# Patient Record
Sex: FEMALE | Race: WHITE | NOT HISPANIC OR LATINO | Employment: OTHER | URBAN - METROPOLITAN AREA
[De-identification: names, ages, dates, MRNs, and addresses within clinical notes are randomized per-mention and may not be internally consistent; named-entity substitution may affect disease eponyms.]

---

## 2023-03-24 LAB
ALANINE AMINOTRANSFERASE (SGPT) (U/L) IN SER/PLAS: 10 U/L (ref 7–45)
ALBUMIN (G/DL) IN SER/PLAS: 4.4 G/DL (ref 3.4–5)
ALKALINE PHOSPHATASE (U/L) IN SER/PLAS: 35 U/L (ref 33–136)
ANION GAP IN SER/PLAS: 11 MMOL/L (ref 10–20)
ASPARTATE AMINOTRANSFERASE (SGOT) (U/L) IN SER/PLAS: 15 U/L (ref 9–39)
BASOPHILS (10*3/UL) IN BLOOD BY MANUAL COUNT - WAM: 0.12 X10E9/L (ref 0–0.1)
BASOPHILS/100 LEUKOCYTES IN BLOOD BY MANUAL COUNT - WAM: 6.2 % (ref 0–2)
BILIRUBIN TOTAL (MG/DL) IN SER/PLAS: 0.6 MG/DL (ref 0–1.2)
CALCIUM (MG/DL) IN SER/PLAS: 9.9 MG/DL (ref 8.6–10.6)
CARBON DIOXIDE, TOTAL (MMOL/L) IN SER/PLAS: 30 MMOL/L (ref 21–32)
CHLORIDE (MMOL/L) IN SER/PLAS: 103 MMOL/L (ref 98–107)
CREATININE (MG/DL) IN SER/PLAS: 0.87 MG/DL (ref 0.5–1.05)
EOSINOPHILS (10*3/UL) IN BLOOD BY MANUAL COUNT - WAM: 0.12 X10E9/L (ref 0–0.4)
EOSINOPHILS/100 LEUKOCYTES IN BLOOD BY MANUAL COUNT - WAM: 6.2 % (ref 0–6)
ERYTHROCYTE DISTRIBUTION WIDTH (RATIO) BY AUTOMATED COUNT: 16.8 % (ref 11.5–14.5)
ERYTHROCYTE MEAN CORPUSCULAR HEMOGLOBIN CONCENTRATION (G/DL) BY AUTOMATED: 33.5 G/DL (ref 32–36)
ERYTHROCYTE MEAN CORPUSCULAR VOLUME (FL) BY AUTOMATED COUNT: 99 FL (ref 80–100)
ERYTHROCYTES (10*6/UL) IN BLOOD BY AUTOMATED COUNT: 3.39 X10E12/L (ref 4–5.2)
GFR FEMALE: 66 ML/MIN/1.73M2
GLUCOSE (MG/DL) IN SER/PLAS: 90 MG/DL (ref 74–99)
HEMATOCRIT (%) IN BLOOD BY AUTOMATED COUNT: 33.4 % (ref 36–46)
HEMOGLOBIN (G/DL) IN BLOOD: 11.2 G/DL (ref 12–16)
IMMATURE GRANULOCYTES/100 LEUKOCYTES IN BLOOD BY AUTOMATED COUNT: 0 % (ref 0–0.9)
LEUKOCYTES (10*3/UL) IN BLOOD BY AUTOMATED COUNT: 1.9 X10E9/L (ref 4.4–11.3)
LYMPHOCYTES (10*3/UL) IN BLOOD BY MANUAL COUNT - WAM: 0.44 X10E9/L (ref 0.8–3)
LYMPHOCYTES VARIANT/100 LEUKOCYTES IN BLOOD - WAM: 15.2 % (ref 0–2)
LYMPHOCYTES/100 LEUKOCYTES IN BLOOD BY MANUAL COUNT - WAM: 23.2 % (ref 13–44)
MANUAL DIFFERENTIAL Y/N: ABNORMAL
MONOCYTES (10*3/UL) IN BLOOD BY MANUAL COUNT - WAM: 0.12 X10E9/L (ref 0.05–0.8)
MONOCYTES/100 LEUKOCYTES IN BLOOD BY MANUAL COUNT - WAM: 6.3 % (ref 2–10)
NEUTROPHILS (SEGS+BANDS) (10*3/UL) MANUAL COUNT - WAM: 0.82 X10E9/L (ref 1.6–5.5)
NRBC (PER 100 WBCS) BY AUTOMATED COUNT: 0 /100 WBC (ref 0–0)
PLATELETS (10*3/UL) IN BLOOD AUTOMATED COUNT: 349 X10E9/L (ref 150–450)
POTASSIUM (MMOL/L) IN SER/PLAS: 4.7 MMOL/L (ref 3.5–5.3)
PROTEIN TOTAL: 6.7 G/DL (ref 6.4–8.2)
RBC MORPHOLOGY IN BLOOD: NORMAL
SEGMENTED NEUTROPHILS (10*3/UL) BLOOD MANUAL - WAM: 0.82 X10E9/L (ref 1.6–5)
SEGMENTED NEUTROPHILS/100 LEUKOCYTES BY MANUAL COUNT -: 42.9 % (ref 40–80)
SODIUM (MMOL/L) IN SER/PLAS: 139 MMOL/L (ref 136–145)
THYROTROPIN (MIU/L) IN SER/PLAS BY DETECTION LIMIT <= 0.05 MIU/L: 0.81 MIU/L (ref 0.44–3.98)
UREA NITROGEN (MG/DL) IN SER/PLAS: 18 MG/DL (ref 6–23)
VARIANT LYMPHOCYTES (10*3/UL) BLOOD MANUAL COUNT - WAM: 0.29 X10E9/L (ref 0–0.3)

## 2023-04-05 ENCOUNTER — OFFICE VISIT (OUTPATIENT)
Dept: PRIMARY CARE | Facility: CLINIC | Age: 83
End: 2023-04-05
Payer: MEDICARE

## 2023-04-05 VITALS
HEIGHT: 65 IN | WEIGHT: 149 LBS | DIASTOLIC BLOOD PRESSURE: 84 MMHG | OXYGEN SATURATION: 98 % | SYSTOLIC BLOOD PRESSURE: 125 MMHG | BODY MASS INDEX: 24.83 KG/M2 | TEMPERATURE: 95.1 F | HEART RATE: 84 BPM

## 2023-04-05 DIAGNOSIS — J01.10 ACUTE FRONTAL SINUSITIS, RECURRENCE NOT SPECIFIED: Primary | ICD-10-CM

## 2023-04-05 PROBLEM — S46.819A STRAIN OF TRAPEZIUS MUSCLE: Status: ACTIVE | Noted: 2023-04-05

## 2023-04-05 PROBLEM — J31.0 CHRONIC RHINITIS: Status: ACTIVE | Noted: 2023-04-05

## 2023-04-05 PROBLEM — N63.0 BREAST MASS IN FEMALE: Status: ACTIVE | Noted: 2023-04-05

## 2023-04-05 PROBLEM — S20.219A CONTUSION OF CHEST: Status: ACTIVE | Noted: 2023-04-05

## 2023-04-05 PROBLEM — M25.562 CHRONIC PAIN OF LEFT KNEE: Status: ACTIVE | Noted: 2023-04-05

## 2023-04-05 PROBLEM — E05.00 GRAVES DISEASE: Status: ACTIVE | Noted: 2023-04-05

## 2023-04-05 PROBLEM — G62.9 PERIPHERAL NEUROPATHY: Status: ACTIVE | Noted: 2023-04-05

## 2023-04-05 PROBLEM — M53.9 MULTILEVEL DEGENERATIVE DISC DISEASE: Status: ACTIVE | Noted: 2023-04-05

## 2023-04-05 PROBLEM — U07.1 COVID-19: Status: ACTIVE | Noted: 2023-04-05

## 2023-04-05 PROBLEM — E55.9 VITAMIN D DEFICIENCY: Status: ACTIVE | Noted: 2023-04-05

## 2023-04-05 PROBLEM — E78.5 DYSLIPIDEMIA: Status: ACTIVE | Noted: 2023-04-05

## 2023-04-05 PROBLEM — E04.1 THYROID NODULE: Status: ACTIVE | Noted: 2023-04-05

## 2023-04-05 PROBLEM — G89.29 CHRONIC PAIN OF LEFT KNEE: Status: ACTIVE | Noted: 2023-04-05

## 2023-04-05 PROBLEM — E03.9 HYPOTHYROIDISM: Status: ACTIVE | Noted: 2023-04-05

## 2023-04-05 PROBLEM — M48.061 LUMBAR SPINAL STENOSIS: Status: ACTIVE | Noted: 2023-04-05

## 2023-04-05 PROCEDURE — 99213 OFFICE O/P EST LOW 20 MIN: CPT | Performed by: FAMILY MEDICINE

## 2023-04-05 PROCEDURE — 1036F TOBACCO NON-USER: CPT | Performed by: FAMILY MEDICINE

## 2023-04-05 PROCEDURE — 1160F RVW MEDS BY RX/DR IN RCRD: CPT | Performed by: FAMILY MEDICINE

## 2023-04-05 PROCEDURE — 1159F MED LIST DOCD IN RCRD: CPT | Performed by: FAMILY MEDICINE

## 2023-04-05 RX ORDER — LETROZOLE 2.5 MG/1
TABLET, FILM COATED ORAL
COMMUNITY
End: 2023-10-27 | Stop reason: SDUPTHER

## 2023-04-05 RX ORDER — ACETAMINOPHEN 500 MG
50 TABLET ORAL DAILY
COMMUNITY

## 2023-04-05 RX ORDER — IBUPROFEN 600 MG/1
1 TABLET ORAL EVERY 6 HOURS PRN
COMMUNITY
Start: 2022-12-27 | End: 2023-10-11 | Stop reason: SDUPTHER

## 2023-04-05 RX ORDER — CEFDINIR 300 MG/1
300 CAPSULE ORAL 2 TIMES DAILY
Qty: 20 CAPSULE | Refills: 0 | Status: SHIPPED | OUTPATIENT
Start: 2023-04-05 | End: 2023-04-15

## 2023-04-05 RX ORDER — LEVOTHYROXINE SODIUM 100 UG/1
100 TABLET ORAL DAILY
COMMUNITY
Start: 2018-03-02

## 2023-04-05 ASSESSMENT — PATIENT HEALTH QUESTIONNAIRE - PHQ9
SUM OF ALL RESPONSES TO PHQ9 QUESTIONS 1 & 2: 0
2. FEELING DOWN, DEPRESSED OR HOPELESS: NOT AT ALL
1. LITTLE INTEREST OR PLEASURE IN DOING THINGS: NOT AT ALL

## 2023-04-05 ASSESSMENT — LIFESTYLE VARIABLES
SKIP TO QUESTIONS 9-10: 1
HOW OFTEN DO YOU HAVE SIX OR MORE DRINKS ON ONE OCCASION: NEVER
HOW OFTEN DO YOU HAVE A DRINK CONTAINING ALCOHOL: 4 OR MORE TIMES A WEEK
HOW MANY STANDARD DRINKS CONTAINING ALCOHOL DO YOU HAVE ON A TYPICAL DAY: 1 OR 2
AUDIT-C TOTAL SCORE: 4

## 2023-04-05 NOTE — PROGRESS NOTES
"82-year-old female with 3-day history of frontal headache otalgia blocked ears stuffy nose without otorrhea sore throat myalgias arthralgias fevers chills sweats shortness of breath chest congestion    Complains of hearing loss in ears bilaterally worse in left  She denies tinnitus vertigo    /84   Pulse 84   Temp 35.1 °C (95.1 °F)   Ht 1.651 m (5' 5\")   Wt 67.6 kg (149 lb)   SpO2 98%   BMI 24.79 kg/m²       Nontoxic-appearing  No retractions  Skin without pallor petechia icterus cyanosis  Left TM initially obscured by cerumen in left canal this was removed with cerumen spoon and irrigation  Right TM with mucoid effusion  Oropharynx red without exudates  Nasal mucosa red with scant rhinorrhea  Bilateral frontal sinus tenderness no maxillary sinus tenderness  Neck without JVD thyromegaly bruits  Chest wall nontender  Chest clear to auscultation without rales rhonchi wheeze  Heart regular rate and rhythm without murmur  Abdomen soft nondistended nontender without organomegaly or mass  "

## 2023-04-24 LAB
BASOPHILS (10*3/UL) IN BLOOD BY AUTOMATED COUNT: 0.06 X10E9/L (ref 0–0.1)
BASOPHILS/100 LEUKOCYTES IN BLOOD BY AUTOMATED COUNT: 2 % (ref 0–2)
EOSINOPHILS (10*3/UL) IN BLOOD BY AUTOMATED COUNT: 0.14 X10E9/L (ref 0–0.4)
EOSINOPHILS/100 LEUKOCYTES IN BLOOD BY AUTOMATED COUNT: 4.6 % (ref 0–6)
ERYTHROCYTE DISTRIBUTION WIDTH (RATIO) BY AUTOMATED COUNT: 16.1 % (ref 11.5–14.5)
ERYTHROCYTE MEAN CORPUSCULAR HEMOGLOBIN CONCENTRATION (G/DL) BY AUTOMATED: 33.3 G/DL (ref 32–36)
ERYTHROCYTE MEAN CORPUSCULAR VOLUME (FL) BY AUTOMATED COUNT: 102 FL (ref 80–100)
ERYTHROCYTES (10*6/UL) IN BLOOD BY AUTOMATED COUNT: 3.46 X10E12/L (ref 4–5.2)
HEMATOCRIT (%) IN BLOOD BY AUTOMATED COUNT: 35.4 % (ref 36–46)
HEMOGLOBIN (G/DL) IN BLOOD: 11.8 G/DL (ref 12–16)
IMMATURE GRANULOCYTES/100 LEUKOCYTES IN BLOOD BY AUTOMATED COUNT: 0.3 % (ref 0–0.9)
LEUKOCYTES (10*3/UL) IN BLOOD BY AUTOMATED COUNT: 3 X10E9/L (ref 4.4–11.3)
LYMPHOCYTES (10*3/UL) IN BLOOD BY AUTOMATED COUNT: 1.03 X10E9/L (ref 0.8–3)
LYMPHOCYTES/100 LEUKOCYTES IN BLOOD BY AUTOMATED COUNT: 34.1 % (ref 13–44)
MONOCYTES (10*3/UL) IN BLOOD BY AUTOMATED COUNT: 0.53 X10E9/L (ref 0.05–0.8)
MONOCYTES/100 LEUKOCYTES IN BLOOD BY AUTOMATED COUNT: 17.5 % (ref 2–10)
NEUTROPHILS (10*3/UL) IN BLOOD BY AUTOMATED COUNT: 1.25 X10E9/L (ref 1.6–5.5)
NEUTROPHILS/100 LEUKOCYTES IN BLOOD BY AUTOMATED COUNT: 41.5 % (ref 40–80)
PLATELETS (10*3/UL) IN BLOOD AUTOMATED COUNT: 286 X10E9/L (ref 150–450)

## 2023-04-25 LAB
ALANINE AMINOTRANSFERASE (SGPT) (U/L) IN SER/PLAS: 14 U/L (ref 7–45)
ALBUMIN (G/DL) IN SER/PLAS: 4.4 G/DL (ref 3.4–5)
ALKALINE PHOSPHATASE (U/L) IN SER/PLAS: 35 U/L (ref 33–136)
ANION GAP IN SER/PLAS: 13 MMOL/L (ref 10–20)
ASPARTATE AMINOTRANSFERASE (SGOT) (U/L) IN SER/PLAS: 17 U/L (ref 9–39)
BILIRUBIN TOTAL (MG/DL) IN SER/PLAS: 0.5 MG/DL (ref 0–1.2)
CALCIUM (MG/DL) IN SER/PLAS: 9.9 MG/DL (ref 8.6–10.6)
CARBON DIOXIDE, TOTAL (MMOL/L) IN SER/PLAS: 27 MMOL/L (ref 21–32)
CHLORIDE (MMOL/L) IN SER/PLAS: 104 MMOL/L (ref 98–107)
CREATININE (MG/DL) IN SER/PLAS: 0.8 MG/DL (ref 0.5–1.05)
GFR FEMALE: 73 ML/MIN/1.73M2
GLUCOSE (MG/DL) IN SER/PLAS: 123 MG/DL (ref 74–99)
POTASSIUM (MMOL/L) IN SER/PLAS: 4.3 MMOL/L (ref 3.5–5.3)
PROTEIN TOTAL: 6.9 G/DL (ref 6.4–8.2)
SODIUM (MMOL/L) IN SER/PLAS: 140 MMOL/L (ref 136–145)
UREA NITROGEN (MG/DL) IN SER/PLAS: 16 MG/DL (ref 6–23)

## 2023-07-18 LAB
ALANINE AMINOTRANSFERASE (SGPT) (U/L) IN SER/PLAS: 16 U/L (ref 7–45)
ALBUMIN (G/DL) IN SER/PLAS: 4.3 G/DL (ref 3.4–5)
ALKALINE PHOSPHATASE (U/L) IN SER/PLAS: 36 U/L (ref 33–136)
ANION GAP IN SER/PLAS: 13 MMOL/L (ref 10–20)
ASPARTATE AMINOTRANSFERASE (SGOT) (U/L) IN SER/PLAS: 20 U/L (ref 9–39)
BASOPHILS (10*3/UL) IN BLOOD BY AUTOMATED COUNT: 0.08 X10E9/L (ref 0–0.1)
BASOPHILS/100 LEUKOCYTES IN BLOOD BY AUTOMATED COUNT: 2.6 % (ref 0–2)
BILIRUBIN TOTAL (MG/DL) IN SER/PLAS: 0.5 MG/DL (ref 0–1.2)
CALCIUM (MG/DL) IN SER/PLAS: 9.7 MG/DL (ref 8.6–10.6)
CARBON DIOXIDE, TOTAL (MMOL/L) IN SER/PLAS: 26 MMOL/L (ref 21–32)
CHLORIDE (MMOL/L) IN SER/PLAS: 103 MMOL/L (ref 98–107)
CREATININE (MG/DL) IN SER/PLAS: 0.84 MG/DL (ref 0.5–1.05)
EOSINOPHILS (10*3/UL) IN BLOOD BY AUTOMATED COUNT: 0.09 X10E9/L (ref 0–0.4)
EOSINOPHILS/100 LEUKOCYTES IN BLOOD BY AUTOMATED COUNT: 2.9 % (ref 0–6)
ERYTHROCYTE DISTRIBUTION WIDTH (RATIO) BY AUTOMATED COUNT: 15.9 % (ref 11.5–14.5)
ERYTHROCYTE MEAN CORPUSCULAR HEMOGLOBIN CONCENTRATION (G/DL) BY AUTOMATED: 33.1 G/DL (ref 32–36)
ERYTHROCYTE MEAN CORPUSCULAR VOLUME (FL) BY AUTOMATED COUNT: 98 FL (ref 80–100)
ERYTHROCYTES (10*6/UL) IN BLOOD BY AUTOMATED COUNT: 3.67 X10E12/L (ref 4–5.2)
GFR FEMALE: 69 ML/MIN/1.73M2
GLUCOSE (MG/DL) IN SER/PLAS: 90 MG/DL (ref 74–99)
HEMATOCRIT (%) IN BLOOD BY AUTOMATED COUNT: 35.9 % (ref 36–46)
HEMOGLOBIN (G/DL) IN BLOOD: 11.9 G/DL (ref 12–16)
IMMATURE GRANULOCYTES/100 LEUKOCYTES IN BLOOD BY AUTOMATED COUNT: 0.3 % (ref 0–0.9)
LEUKOCYTES (10*3/UL) IN BLOOD BY AUTOMATED COUNT: 3.1 X10E9/L (ref 4.4–11.3)
LYMPHOCYTES (10*3/UL) IN BLOOD BY AUTOMATED COUNT: 1.18 X10E9/L (ref 0.8–3)
LYMPHOCYTES/100 LEUKOCYTES IN BLOOD BY AUTOMATED COUNT: 37.9 % (ref 13–44)
MONOCYTES (10*3/UL) IN BLOOD BY AUTOMATED COUNT: 0.49 X10E9/L (ref 0.05–0.8)
MONOCYTES/100 LEUKOCYTES IN BLOOD BY AUTOMATED COUNT: 15.8 % (ref 2–10)
NEUTROPHILS (10*3/UL) IN BLOOD BY AUTOMATED COUNT: 1.26 X10E9/L (ref 1.6–5.5)
NEUTROPHILS/100 LEUKOCYTES IN BLOOD BY AUTOMATED COUNT: 40.5 % (ref 40–80)
NRBC (PER 100 WBCS) BY AUTOMATED COUNT: 0 /100 WBC (ref 0–0)
PLATELETS (10*3/UL) IN BLOOD AUTOMATED COUNT: 319 X10E9/L (ref 150–450)
POTASSIUM (MMOL/L) IN SER/PLAS: 4.4 MMOL/L (ref 3.5–5.3)
PROTEIN TOTAL: 6.8 G/DL (ref 6.4–8.2)
SODIUM (MMOL/L) IN SER/PLAS: 138 MMOL/L (ref 136–145)
UREA NITROGEN (MG/DL) IN SER/PLAS: 13 MG/DL (ref 6–23)

## 2023-09-25 ENCOUNTER — OFFICE VISIT (OUTPATIENT)
Dept: PRIMARY CARE | Facility: CLINIC | Age: 83
End: 2023-09-25
Payer: MEDICARE

## 2023-09-25 VITALS
SYSTOLIC BLOOD PRESSURE: 153 MMHG | WEIGHT: 148.9 LBS | TEMPERATURE: 97.6 F | HEART RATE: 85 BPM | OXYGEN SATURATION: 96 % | BODY MASS INDEX: 24.78 KG/M2 | DIASTOLIC BLOOD PRESSURE: 91 MMHG

## 2023-09-25 DIAGNOSIS — M25.431 PAIN AND SWELLING OF RIGHT WRIST: ICD-10-CM

## 2023-09-25 DIAGNOSIS — S43.421A SPRAIN OF RIGHT ROTATOR CUFF CAPSULE, INITIAL ENCOUNTER: Primary | ICD-10-CM

## 2023-09-25 DIAGNOSIS — M25.531 PAIN AND SWELLING OF RIGHT WRIST: ICD-10-CM

## 2023-09-25 DIAGNOSIS — S06.0XAD CONCUSSION WITH UNKNOWN LOSS OF CONSCIOUSNESS STATUS, SUBSEQUENT ENCOUNTER: ICD-10-CM

## 2023-09-25 PROCEDURE — 99215 OFFICE O/P EST HI 40 MIN: CPT | Performed by: FAMILY MEDICINE

## 2023-09-25 PROCEDURE — 1159F MED LIST DOCD IN RCRD: CPT | Performed by: FAMILY MEDICINE

## 2023-09-25 PROCEDURE — 1160F RVW MEDS BY RX/DR IN RCRD: CPT | Performed by: FAMILY MEDICINE

## 2023-09-25 PROCEDURE — 1036F TOBACCO NON-USER: CPT | Performed by: FAMILY MEDICINE

## 2023-09-25 ASSESSMENT — PATIENT HEALTH QUESTIONNAIRE - PHQ9
2. FEELING DOWN, DEPRESSED OR HOPELESS: NOT AT ALL
1. LITTLE INTEREST OR PLEASURE IN DOING THINGS: NOT AT ALL
SUM OF ALL RESPONSES TO PHQ9 QUESTIONS 1 AND 2: 0

## 2023-09-25 NOTE — PROGRESS NOTES
83-year-old female seen with her son today.  Apparently her son was called by her father told him that his wife had fallen and needs help getting out.  He arrived on the scene to find dried blood on the floor and on the countertop with his mom awake but lying on the floor.  She had blood covering her face and torso.  She refused to go to the emergency room she is here today.  She recalls getting up from a table and walking in the kitchen.  She is amnestic about the event and remembers her son coming over.  Son estimates that perhaps she was down for about an hour level of consciousness after the fall is indeterminate.  She took Advil and went to bed but was complaining of shoulder and hand pain.  She has sustained a deep laceration over her right periorbital area and this was cleaned and closed with a bandage.  Son noted that she was not lethargic or sleepy after the fall.  There is no dysarthria or aphasia.  She denies numbness tingling or weakness.  She does have right frontal periorbital headache without nausea vomiting fevers chills neck stiffness.          BP (!) 153/91   Pulse 85   Temp 36.4 °C (97.6 °F)   Wt 67.5 kg (148 lb 14.4 oz)   SpO2 96%   BMI 24.78 kg/m²     Large full-thickness C-shaped 2 and half centimeter laceration right periorbital area  Right frontal and parietal tenderness to palpation  TMs normal without hemotympanums  No crump sign  No midline cervical paracervical tenderness or spasm  Chest clear to auscultation  Regular rate rhythm without rub  Right AC nontender without deformity  Rotator cuff range of motion and strength limited on the right  Ecchymosis over proximal humerus and mid humerus and hand dorsum of the right  No gross deformity  Nontender  Extremity without erythema or tenderness  Neuro exam limited but nonfocal    Patient was instructed to be taken by son to the emergency room for further evaluation which will  include laceration repair noncontrast CT of the head and  x-rays of the right shoulder right humerus and wrist    Discussed with ED triage @ [facility]

## 2023-10-03 ENCOUNTER — OFFICE VISIT (OUTPATIENT)
Dept: ORTHOPEDIC SURGERY | Facility: CLINIC | Age: 83
End: 2023-10-03
Payer: MEDICARE

## 2023-10-03 VITALS — WEIGHT: 145 LBS | HEIGHT: 65 IN | BODY MASS INDEX: 24.16 KG/M2

## 2023-10-03 DIAGNOSIS — S42.293D OTHER CLOSED DISPLACED FRACTURE OF PROXIMAL END OF HUMERUS WITH ROUTINE HEALING, UNSPECIFIED LATERALITY, SUBSEQUENT ENCOUNTER: Primary | ICD-10-CM

## 2023-10-03 PROCEDURE — 1036F TOBACCO NON-USER: CPT | Performed by: ORTHOPAEDIC SURGERY

## 2023-10-03 PROCEDURE — 1159F MED LIST DOCD IN RCRD: CPT | Performed by: ORTHOPAEDIC SURGERY

## 2023-10-03 PROCEDURE — 1160F RVW MEDS BY RX/DR IN RCRD: CPT | Performed by: ORTHOPAEDIC SURGERY

## 2023-10-03 PROCEDURE — 99203 OFFICE O/P NEW LOW 30 MIN: CPT | Performed by: ORTHOPAEDIC SURGERY

## 2023-10-03 NOTE — PROGRESS NOTES
83-year-old right-hand-dominant female fell injuring her right shoulder does not recall the exact mechanism of injury  She does have a history of breast CA with mets to the spine and sternum  She is undergoing treatment for  She had no antecedent shoulder pain  Past medical,family and social histories have been reviewed and are up to date.  All other body systems have been reviewed and are negative for complaint.  Constitutional: Well-developed well-nourished   Eyes: Sclerae anicteric, pupils equal and round  HENT: Normocephalic atraumatic  Cardiovascular: Pulses full, regular rate and rhythm  Respiratory: Breathing not labored, no wheezing  Integumentary: Skin intact, no lesions or rashes  Neurological: Sensation intact, no gross strength deficits, reflexes equal  Psychiatric: Alert oriented and appropriate  Hematologic/lymphatic: No lymphadenopathy  Right shoulder: Bruising down the humeral region some bruising down in the hand as well elbow wrist and hand are intact  X-rays show nondisplaced comminuted greater tuberosity fracture the shoulder was located  Unclear whether this is related to her breast CA  Plan at this point is to keep her in a sling for protection we will see her back in follow-up in 3 weeks  All questions answered

## 2023-10-03 NOTE — LETTER
October 5, 2023     Francisco Javier Bennett MD  [address]  [address]  [facility] [address]    Patient: Angela Hope   YOB: 1940   Date of Visit: 10/3/2023       Dear Dr. Francisco Javier Bennett MD:    Thank you for referring Angela Hope to me for evaluation. Below are my notes for this consultation.  If you have questions, please do not hesitate to call me. I look forward to following your patient along with you.       Sincerely,     Kenneth Hull MD      CC: No Recipients  ______________________________________________________________________________________    83-year-old right-hand-dominant female fell injuring her right shoulder does not recall the exact mechanism of injury  She does have a history of breast CA with mets to the spine and sternum  She is undergoing treatment for  She had no antecedent shoulder pain  Past medical,family and social histories have been reviewed and are up to date.  All other body systems have been reviewed and are negative for complaint.  Constitutional: Well-developed well-nourished   Eyes: Sclerae anicteric, pupils equal and round  HENT: Normocephalic atraumatic  Cardiovascular: Pulses full, regular rate and rhythm  Respiratory: Breathing not labored, no wheezing  Integumentary: Skin intact, no lesions or rashes  Neurological: Sensation intact, no gross strength deficits, reflexes equal  Psychiatric: Alert oriented and appropriate  Hematologic/lymphatic: No lymphadenopathy  Right shoulder: Bruising down the humeral region some bruising down in the hand as well elbow wrist and hand are intact  X-rays show nondisplaced comminuted greater tuberosity fracture the shoulder was located  Unclear whether this is related to her breast CA  Plan at this point is to keep her in a sling for protection we will see her back in follow-up in 3 weeks  All questions answered

## 2023-10-11 ENCOUNTER — TELEPHONE (OUTPATIENT)
Dept: PRIMARY CARE | Facility: CLINIC | Age: 83
End: 2023-10-11
Payer: MEDICARE

## 2023-10-11 DIAGNOSIS — G89.29 CHRONIC PAIN OF LEFT KNEE: Primary | ICD-10-CM

## 2023-10-11 DIAGNOSIS — M25.562 CHRONIC PAIN OF LEFT KNEE: Primary | ICD-10-CM

## 2023-10-11 RX ORDER — IBUPROFEN 600 MG/1
600 TABLET ORAL EVERY 6 HOURS PRN
Qty: 120 TABLET | Refills: 0 | Status: SHIPPED | OUTPATIENT
Start: 2023-10-11 | End: 2023-11-28

## 2023-10-11 NOTE — TELEPHONE ENCOUNTER
Rx Refill Request Telephone Encounter    Name:  Angela Hope  :  [id number]  Medication Name:  Ibuprofen   Dose : 600  mg   Route :    Frequency : 1 by mouth every 6 hrs as needed  Quantity : 120  Directions :    Specific Pharmacy location:  [facility]  Date of last appointment:  23  Date of next appointment:    Best number to reach patient:  [phone]

## 2023-10-24 ENCOUNTER — ANCILLARY PROCEDURE (OUTPATIENT)
Dept: RADIOLOGY | Facility: CLINIC | Age: 83
End: 2023-10-24
Payer: MEDICARE

## 2023-10-24 ENCOUNTER — OFFICE VISIT (OUTPATIENT)
Dept: ORTHOPEDIC SURGERY | Facility: CLINIC | Age: 83
End: 2023-10-24
Payer: MEDICARE

## 2023-10-24 VITALS — HEIGHT: 65 IN | BODY MASS INDEX: 25.09 KG/M2 | WEIGHT: 150.6 LBS

## 2023-10-24 DIAGNOSIS — M25.511 RIGHT SHOULDER PAIN, UNSPECIFIED CHRONICITY: ICD-10-CM

## 2023-10-24 DIAGNOSIS — M25.511 RIGHT SHOULDER PAIN, UNSPECIFIED CHRONICITY: Primary | ICD-10-CM

## 2023-10-24 PROCEDURE — 1036F TOBACCO NON-USER: CPT | Performed by: ORTHOPAEDIC SURGERY

## 2023-10-24 PROCEDURE — 1160F RVW MEDS BY RX/DR IN RCRD: CPT | Performed by: ORTHOPAEDIC SURGERY

## 2023-10-24 PROCEDURE — 1125F AMNT PAIN NOTED PAIN PRSNT: CPT | Performed by: ORTHOPAEDIC SURGERY

## 2023-10-24 PROCEDURE — 1159F MED LIST DOCD IN RCRD: CPT | Performed by: ORTHOPAEDIC SURGERY

## 2023-10-24 PROCEDURE — 73030 X-RAY EXAM OF SHOULDER: CPT | Mod: RT

## 2023-10-24 PROCEDURE — 99213 OFFICE O/P EST LOW 20 MIN: CPT | Performed by: ORTHOPAEDIC SURGERY

## 2023-10-24 PROCEDURE — 73030 X-RAY EXAM OF SHOULDER: CPT | Mod: RIGHT SIDE | Performed by: RADIOLOGY

## 2023-10-24 ASSESSMENT — PAIN DESCRIPTION - DESCRIPTORS: DESCRIPTORS: CRAMPING;SHOOTING

## 2023-10-24 ASSESSMENT — PAIN SCALES - GENERAL: PAINLEVEL_OUTOF10: 6

## 2023-10-24 ASSESSMENT — PAIN - FUNCTIONAL ASSESSMENT: PAIN_FUNCTIONAL_ASSESSMENT: 0-10

## 2023-10-24 NOTE — PROGRESS NOTES
Follow-up greater tuberosity fracture right shoulder she has been treated in sling she feels a bit better but still having significant discomfort and swelling  Past medical,family and social histories have been reviewed and are up to date.  All other body systems have been reviewed and are negative for complaint.  Constitutional: Well-developed well-nourished   Eyes: Sclerae anicteric, pupils equal and round  HENT: Normocephalic atraumatic  Cardiovascular: Pulses full, regular rate and rhythm  Respiratory: Breathing not labored, no wheezing  Integumentary: Skin intact, no lesions or rashes  Neurological: Sensation intact, no gross strength deficits, reflexes equal  Psychiatric: Alert oriented and appropriate  Hematologic/lymphatic: No lymphadenopathy  Right shoulder: Good passive mobility some distal swelling  X-rays show extensive calcification around the tuberosity  Impression healing tuberosity fracture  We will keep her in a sling for another couple of weeks then begin outpatient therapy we discussed prognosis and expectations of fairly lengthy recovery  All questions answered

## 2023-10-26 ENCOUNTER — APPOINTMENT (OUTPATIENT)
Dept: RADIOLOGY | Facility: CLINIC | Age: 83
End: 2023-10-26
Payer: MEDICARE

## 2023-10-27 DIAGNOSIS — N63.0 BREAST MASS IN FEMALE: Primary | ICD-10-CM

## 2023-10-27 RX ORDER — LETROZOLE 2.5 MG/1
2.5 TABLET, FILM COATED ORAL EVERY OTHER DAY
Qty: 30 TABLET | Refills: 5 | Status: SHIPPED | OUTPATIENT
Start: 2023-10-27 | End: 2023-12-04 | Stop reason: SDUPTHER

## 2023-10-27 NOTE — PROGRESS NOTES
[facility] Clinic  Location: [place]    Visit Type: Follow-up Visit    ONC History:    She presented with a left-sided breast cancer in , requiring neoadjuvant chemotherapy. In 2006, she had resection of a 2 cm grade 2 infiltrating ductal carcinoma with 8 of 10 positive axillary lymph nodes. She was clinically staged T3 N1,  although would be N2 by today's standards. Hormone receptors are positive and HER-2/liliana negative   FAMILY HISTORY: She had a maternal aunt with a head and neck cancer at 78. She had a brother who  of lung cancer in the context of smoking.      TREATMENT SUMMARY:    Neoadjuvant chemotherapy included 4 cycles of Adriamycin and cyclophosphamide, followed by 4 cycles of paclitaxel completed  2006.    Left lumpectomy and axillary node dissection, 2006.    Left breast radiation, completed 2007.    Anastrozole was started 2007, recently as she stopped her medication either March or 2013.   Anastrozole ongoing with initial treatment started 2007, and medications stopped around 2013, and then restarted in 2013 -- she stopped sometime in 2020: First Relapse Date: Recurrence confirmed to bone- ER+   2020: New Treatment Plan: Letrozole started   2020-3-10: Radiation Therapy: SBRT to lumbar spine   2020-3-24: Chemotherapy: Palbociclib started   2020: Chemotherapy: Palbociclib decreased to 100mg due to marrow toxicity   2021: Chemotherapy: Ibrance on hold   2022: Chemotherapy: restart of Ibrance 100mg      Subjective: Interval History    5 weeks ago had fall in kitchen, mechanical fall per patient. Broke her right humerus and has a laceration above right eyebrow. Seeing ortho. Taking ibuprofen. Because of this she was unable to get her scans done.     Otherwise, no major issues.     Continues on letrozole and ibrance.     Denies weight loss, changes in the breast and/or chest wall,  "new aches or pains, changes in appetite or energy level.     Social History  Angela A Ciganko  reports that she has never smoked. She has never used smokeless tobacco.  She  reports that she does not currently use alcohol.  She  reports no history of drug use.    ROS:     Review of Systems   All other systems reviewed and are negative.       Physical Examination:    /76 (BP Location: Right arm, Patient Position: Sitting, BP Cuff Size: Adult)   Pulse 64   Temp 36.3 °C (97.3 °F) (Temporal)   Resp 18   Ht 1.651 m (5' 5\")   Wt 69.2 kg (152 lb 8.9 oz)   SpO2 100%   BMI 25.39 kg/m²     Physical Exam  Vitals and nursing note reviewed.   Constitutional:       General: She is not in acute distress.     Appearance: Normal appearance. She is not toxic-appearing.   HENT:      Head: Normocephalic and atraumatic.      Mouth/Throat:      Pharynx: Oropharynx is clear.   Eyes:      Extraocular Movements: Extraocular movements intact.      Conjunctiva/sclera: Conjunctivae normal.   Cardiovascular:      Rate and Rhythm: Normal rate and regular rhythm.   Pulmonary:      Effort: Pulmonary effort is normal. No respiratory distress.   Abdominal:      General: Abdomen is flat. Bowel sounds are normal.      Palpations: Abdomen is soft.   Musculoskeletal:         General: No swelling. Normal range of motion.      Cervical back: Normal range of motion.   Skin:     General: Skin is warm and dry.   Neurological:      General: No focal deficit present.      Mental Status: She is alert.   Psychiatric:         Mood and Affect: Mood normal.         Breast Examination:    Deferred    ECOG Performance Status:     [x] 0 Fully active, able to carry on all pre-disease performance without restriction  [] 1 Restricted in physically strenuous activity but ambulatory and able to carry out work of a light or sedentary nature, e.g., light house work, office work  [] 2 Ambulatory and capable of all selfcare but unable to carry out any work " activities; up and about more than 50% of waking hours  [] 3 Capable of only limited selfcare; confined to bed or chair more than 50% of waking hours  [] 4 Completely disabled; cannot carry on any selfcare; totally confined to bed or chair  [] 5 Dead     Results:    Labs:  No pertinent results since last visit.      Imaging:  No pertinent results since last visit.      Pathology:  No pertinent results since last visit.      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 Has been on letrozole and palbociclib 100 mg since 2020.     Was unable to obtain most recent staging scans due to a fall from which she broke her humerus. She would like to defer these scans until she has gained ROM back in the R arm. Did not obtain labs since last visit, reports this was because she did not get a paper requisition at her last visit however, she did not call between visits to address this. Will obtain labs today. Follow-up in one month to assess shoulder and her ability to get scans again.       Plan:    Surgical Plan: Left lumpectomy and axillary node dissection, October 2006.   Additional biopsy: Not indicated  Radiation Plan: Left breast radiation, completed January 2007; SBRT to lumbar spine in 2020  Additional staging scans/DEXA/echo: NM bone scan reviewed. DEXA reviewed.   Additional Path info (i.e Ki67, PDL1):Not indicated   Gene assays: Not indicated     Systemic treatment plan: Letrozole and ibrance 100 mg                   Intent: Palliative                Clinical trial: N/A                Endocrine therapy: Progressed on anastrozole. On letrozole since 2020                HER2 treatment: Not indicated                Targeted agents: Ibrance                Chemotherapy: 4 cycles of Adriamycin and cyclophosphamide, followed by 4 cycles of paclitaxel completed September 2006.                 BMA: We discussed the role of zoledronic acid as supportive therapy in light of osseous disease to  minimize the risk and delay skeletal related events. We discussed that this would be given as  an infusion every 3 months. We discussed the toxicities that may be associated with this including the risk of osteonecrosis of the jaw. I have recommended an dental evaluation before this is initiated. She was given written information on Zoledronic  acid for her review and we will plan to initiate this at her follow-up visit once she has received clearance from her dentist. Will discuss again at her next visit.      Access: Not indicated  Supportive meds: Not indicated  Genetic testing: Will discuss at future visit  Fertility preservation: Not indicated  Other active problems/orders:      Osteopenia: Zometa discussed as above. We discussed general recommendations for bone loss prevention which include 1514-7080 mg of calcium intake per day for adults >50yrs, and no history of renal calculi;  800-1000 IU of vitamin D3 per day for adults >50yrs, and no history of renal calculi. Weight bearing exercise. Discontinue smoking. Avoid excessive use of caffeine, soft drinks, and alcoholic beverages. In addition, balance training and fall prevention  programs can help reduce the risk of fractures.      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 We discussed extensively that my practice is to involve patient's PCP in management of non-oncologic issues. We agreed on these expectations moving forward.      Surveillance plan: Will re-order CT CAP and NM bone scan at her follow-up visit if she has recovered from her injury; monthly labs and TM.    Follow-up: 1 month    Patient expressed understanding of the plan outlined above. She had ample time to ask questions. She understands she can contact us should she have additional questions or issues arise in the interim.

## 2023-10-30 ENCOUNTER — LAB (OUTPATIENT)
Dept: LAB | Facility: CLINIC | Age: 83
End: 2023-10-30
Payer: MEDICARE

## 2023-10-30 ENCOUNTER — OFFICE VISIT (OUTPATIENT)
Dept: HEMATOLOGY/ONCOLOGY | Facility: CLINIC | Age: 83
End: 2023-10-30
Payer: MEDICARE

## 2023-10-30 VITALS
TEMPERATURE: 97.3 F | WEIGHT: 152.56 LBS | OXYGEN SATURATION: 100 % | DIASTOLIC BLOOD PRESSURE: 76 MMHG | BODY MASS INDEX: 25.42 KG/M2 | RESPIRATION RATE: 18 BRPM | HEIGHT: 65 IN | HEART RATE: 64 BPM | SYSTOLIC BLOOD PRESSURE: 158 MMHG

## 2023-10-30 DIAGNOSIS — C50.919 MALIGNANT NEOPLASM OF BREAST IN FEMALE, ESTROGEN RECEPTOR POSITIVE, UNSPECIFIED LATERALITY, UNSPECIFIED SITE OF BREAST (MULTI): ICD-10-CM

## 2023-10-30 DIAGNOSIS — C50.919 MALIGNANT NEOPLASM OF BREAST IN FEMALE, ESTROGEN RECEPTOR POSITIVE, UNSPECIFIED LATERALITY, UNSPECIFIED SITE OF BREAST (MULTI): Primary | ICD-10-CM

## 2023-10-30 DIAGNOSIS — Z17.0 MALIGNANT NEOPLASM OF BREAST IN FEMALE, ESTROGEN RECEPTOR POSITIVE, UNSPECIFIED LATERALITY, UNSPECIFIED SITE OF BREAST (MULTI): Primary | ICD-10-CM

## 2023-10-30 DIAGNOSIS — Z17.0 MALIGNANT NEOPLASM OF BREAST IN FEMALE, ESTROGEN RECEPTOR POSITIVE, UNSPECIFIED LATERALITY, UNSPECIFIED SITE OF BREAST (MULTI): ICD-10-CM

## 2023-10-30 LAB
BASOPHILS # BLD AUTO: 0.03 X10*3/UL (ref 0–0.1)
BASOPHILS NFR BLD AUTO: 1.1 %
EOSINOPHIL # BLD AUTO: 0.23 X10*3/UL (ref 0–0.4)
EOSINOPHIL NFR BLD AUTO: 8.2 %
ERYTHROCYTE [DISTWIDTH] IN BLOOD BY AUTOMATED COUNT: 16.5 % (ref 11.5–14.5)
HCT VFR BLD AUTO: 34.8 % (ref 36–46)
HGB BLD-MCNC: 11.3 G/DL (ref 12–16)
IMM GRANULOCYTES # BLD AUTO: 0.01 X10*3/UL (ref 0–0.5)
IMM GRANULOCYTES NFR BLD AUTO: 0.4 % (ref 0–0.9)
LYMPHOCYTES # BLD AUTO: 0.9 X10*3/UL (ref 0.8–3)
LYMPHOCYTES NFR BLD AUTO: 31.9 %
MCH RBC QN AUTO: 33.5 PG (ref 26–34)
MCHC RBC AUTO-ENTMCNC: 32.5 G/DL (ref 32–36)
MCV RBC AUTO: 103 FL (ref 80–100)
MONOCYTES # BLD AUTO: 0.25 X10*3/UL (ref 0.05–0.8)
MONOCYTES NFR BLD AUTO: 8.9 %
NEUTROPHILS # BLD AUTO: 1.4 X10*3/UL (ref 1.6–5.5)
NEUTROPHILS NFR BLD AUTO: 49.5 %
NRBC BLD-RTO: ABNORMAL /100{WBCS}
OVALOCYTES BLD QL SMEAR: NORMAL
PLATELET # BLD AUTO: 317 X10*3/UL (ref 150–450)
PMV BLD AUTO: 9.8 FL (ref 7.5–11.5)
RBC # BLD AUTO: 3.37 X10*6/UL (ref 4–5.2)
RBC MORPH BLD: NORMAL
WBC # BLD AUTO: 2.8 X10*3/UL (ref 4.4–11.3)

## 2023-10-30 PROCEDURE — 1160F RVW MEDS BY RX/DR IN RCRD: CPT | Performed by: STUDENT IN AN ORGANIZED HEALTH CARE EDUCATION/TRAINING PROGRAM

## 2023-10-30 PROCEDURE — 1159F MED LIST DOCD IN RCRD: CPT | Performed by: STUDENT IN AN ORGANIZED HEALTH CARE EDUCATION/TRAINING PROGRAM

## 2023-10-30 PROCEDURE — 36415 COLL VENOUS BLD VENIPUNCTURE: CPT

## 2023-10-30 PROCEDURE — 1036F TOBACCO NON-USER: CPT | Performed by: STUDENT IN AN ORGANIZED HEALTH CARE EDUCATION/TRAINING PROGRAM

## 2023-10-30 PROCEDURE — 99214 OFFICE O/P EST MOD 30 MIN: CPT | Mod: PO | Performed by: STUDENT IN AN ORGANIZED HEALTH CARE EDUCATION/TRAINING PROGRAM

## 2023-10-30 PROCEDURE — 82378 CARCINOEMBRYONIC ANTIGEN: CPT | Performed by: STUDENT IN AN ORGANIZED HEALTH CARE EDUCATION/TRAINING PROGRAM

## 2023-10-30 PROCEDURE — 1125F AMNT PAIN NOTED PAIN PRSNT: CPT | Performed by: STUDENT IN AN ORGANIZED HEALTH CARE EDUCATION/TRAINING PROGRAM

## 2023-10-30 PROCEDURE — 80053 COMPREHEN METABOLIC PANEL: CPT | Performed by: STUDENT IN AN ORGANIZED HEALTH CARE EDUCATION/TRAINING PROGRAM

## 2023-10-30 PROCEDURE — 86300 IMMUNOASSAY TUMOR CA 15-3: CPT | Performed by: STUDENT IN AN ORGANIZED HEALTH CARE EDUCATION/TRAINING PROGRAM

## 2023-10-30 PROCEDURE — 99214 OFFICE O/P EST MOD 30 MIN: CPT | Performed by: STUDENT IN AN ORGANIZED HEALTH CARE EDUCATION/TRAINING PROGRAM

## 2023-10-30 PROCEDURE — 85025 COMPLETE CBC W/AUTO DIFF WBC: CPT

## 2023-10-30 ASSESSMENT — PAIN SCALES - GENERAL: PAINLEVEL: 5

## 2023-10-31 LAB
ALBUMIN SERPL BCP-MCNC: 4.5 G/DL (ref 3.4–5)
ALP SERPL-CCNC: 47 U/L (ref 33–136)
ALT SERPL W P-5'-P-CCNC: 14 U/L (ref 7–45)
ANION GAP SERPL CALC-SCNC: 17 MMOL/L (ref 10–20)
AST SERPL W P-5'-P-CCNC: 18 U/L (ref 9–39)
BILIRUB SERPL-MCNC: 0.5 MG/DL (ref 0–1.2)
BUN SERPL-MCNC: 21 MG/DL (ref 6–23)
CALCIUM SERPL-MCNC: 9.6 MG/DL (ref 8.6–10.6)
CANCER AG27-29 SERPL-ACNC: 22.7 U/ML (ref 0–38.6)
CEA SERPL-MCNC: 3.1 UG/L
CHLORIDE SERPL-SCNC: 105 MMOL/L (ref 98–107)
CO2 SERPL-SCNC: 22 MMOL/L (ref 21–32)
CREAT SERPL-MCNC: 1.02 MG/DL (ref 0.5–1.05)
GFR SERPL CREATININE-BSD FRML MDRD: 55 ML/MIN/1.73M*2
GLUCOSE SERPL-MCNC: 89 MG/DL (ref 74–99)
POTASSIUM SERPL-SCNC: 4.9 MMOL/L (ref 3.5–5.3)
PROT SERPL-MCNC: 7.2 G/DL (ref 6.4–8.2)
SODIUM SERPL-SCNC: 139 MMOL/L (ref 136–145)

## 2023-11-07 ENCOUNTER — TELEPHONE (OUTPATIENT)
Dept: HEMATOLOGY/ONCOLOGY | Facility: CLINIC | Age: 83
End: 2023-11-07

## 2023-11-07 ENCOUNTER — OFFICE VISIT (OUTPATIENT)
Dept: ORTHOPEDIC SURGERY | Facility: CLINIC | Age: 83
End: 2023-11-07
Payer: MEDICARE

## 2023-11-07 ENCOUNTER — ANCILLARY PROCEDURE (OUTPATIENT)
Dept: RADIOLOGY | Facility: CLINIC | Age: 83
End: 2023-11-07
Payer: MEDICARE

## 2023-11-07 VITALS — WEIGHT: 151.1 LBS | HEIGHT: 65 IN | BODY MASS INDEX: 25.18 KG/M2

## 2023-11-07 DIAGNOSIS — S42.253S: ICD-10-CM

## 2023-11-07 DIAGNOSIS — S42.253S: Primary | ICD-10-CM

## 2023-11-07 PROCEDURE — 1160F RVW MEDS BY RX/DR IN RCRD: CPT | Performed by: ORTHOPAEDIC SURGERY

## 2023-11-07 PROCEDURE — 1125F AMNT PAIN NOTED PAIN PRSNT: CPT | Performed by: ORTHOPAEDIC SURGERY

## 2023-11-07 PROCEDURE — 1036F TOBACCO NON-USER: CPT | Performed by: ORTHOPAEDIC SURGERY

## 2023-11-07 PROCEDURE — 73030 X-RAY EXAM OF SHOULDER: CPT | Mod: RIGHT SIDE | Performed by: RADIOLOGY

## 2023-11-07 PROCEDURE — 73030 X-RAY EXAM OF SHOULDER: CPT | Mod: RT,FY

## 2023-11-07 PROCEDURE — 1159F MED LIST DOCD IN RCRD: CPT | Performed by: ORTHOPAEDIC SURGERY

## 2023-11-07 PROCEDURE — 99212 OFFICE O/P EST SF 10 MIN: CPT | Performed by: ORTHOPAEDIC SURGERY

## 2023-11-07 ASSESSMENT — PAIN - FUNCTIONAL ASSESSMENT: PAIN_FUNCTIONAL_ASSESSMENT: 0-10

## 2023-11-07 ASSESSMENT — PAIN DESCRIPTION - DESCRIPTORS: DESCRIPTORS: THROBBING

## 2023-11-07 ASSESSMENT — PAIN SCALES - GENERAL: PAINLEVEL_OUTOF10: 5 - MODERATE PAIN

## 2023-11-07 NOTE — PROGRESS NOTES
Follow-up proximal humerus fracture doing better she is 6 weeks out limited elevator about 90 degrees  Repeat x-rays show healing tuberosity fracture  Assessment healing tuberosity fracture plan physical therapy prescription and instructed in home exercises follow-up in 6 weeks

## 2023-11-07 NOTE — TELEPHONE ENCOUNTER
Called patient to review renewal with [employer] for next year.  Left message on identifiable vm and advised to bring medical and rx cards.  Can bring income docs or we can check box for electronic verification.  Provided call back instructions if they have any questions, otherwise, will see them on Mon 12/4 when they come in for that appt.

## 2023-11-27 DIAGNOSIS — M25.562 CHRONIC PAIN OF LEFT KNEE: ICD-10-CM

## 2023-11-27 DIAGNOSIS — G89.29 CHRONIC PAIN OF LEFT KNEE: ICD-10-CM

## 2023-11-27 PROBLEM — J06.9 ACUTE URI: Status: ACTIVE | Noted: 2023-11-27

## 2023-11-27 PROBLEM — S42.91XA FRACTURE OF RIGHT SHOULDER: Status: ACTIVE | Noted: 2023-11-27

## 2023-11-27 PROBLEM — J01.90 ACUTE SINUSITIS: Status: ACTIVE | Noted: 2023-11-27

## 2023-11-27 PROBLEM — M85.80 OSTEOPENIA: Status: ACTIVE | Noted: 2023-11-27

## 2023-11-27 PROBLEM — S01.81XA LACERATION OF FOREHEAD: Status: ACTIVE | Noted: 2023-11-27

## 2023-11-28 RX ORDER — IBUPROFEN 600 MG/1
TABLET ORAL
Qty: 120 TABLET | Refills: 0 | Status: SHIPPED | OUTPATIENT
Start: 2023-11-28 | End: 2023-12-26

## 2023-12-04 ENCOUNTER — LAB (OUTPATIENT)
Dept: LAB | Facility: CLINIC | Age: 83
End: 2023-12-04
Payer: MEDICARE

## 2023-12-04 ENCOUNTER — OFFICE VISIT (OUTPATIENT)
Dept: HEMATOLOGY/ONCOLOGY | Facility: CLINIC | Age: 83
End: 2023-12-04
Payer: MEDICARE

## 2023-12-04 VITALS
TEMPERATURE: 97 F | WEIGHT: 151.35 LBS | HEART RATE: 78 BPM | DIASTOLIC BLOOD PRESSURE: 83 MMHG | RESPIRATION RATE: 16 BRPM | SYSTOLIC BLOOD PRESSURE: 161 MMHG | OXYGEN SATURATION: 97 % | BODY MASS INDEX: 25.19 KG/M2

## 2023-12-04 DIAGNOSIS — Z17.0 MALIGNANT NEOPLASM OF BREAST IN FEMALE, ESTROGEN RECEPTOR POSITIVE, UNSPECIFIED LATERALITY, UNSPECIFIED SITE OF BREAST (MULTI): ICD-10-CM

## 2023-12-04 DIAGNOSIS — C79.51 CARCINOMA OF LEFT BREAST METASTATIC TO BONE (MULTI): Primary | ICD-10-CM

## 2023-12-04 DIAGNOSIS — N63.0 BREAST MASS IN FEMALE: ICD-10-CM

## 2023-12-04 DIAGNOSIS — C50.919 MALIGNANT NEOPLASM OF BREAST IN FEMALE, ESTROGEN RECEPTOR POSITIVE, UNSPECIFIED LATERALITY, UNSPECIFIED SITE OF BREAST (MULTI): ICD-10-CM

## 2023-12-04 DIAGNOSIS — C50.912 CARCINOMA OF LEFT BREAST METASTATIC TO BONE (MULTI): Primary | ICD-10-CM

## 2023-12-04 LAB
BASOPHILS # BLD AUTO: 0.04 X10*3/UL (ref 0–0.1)
BASOPHILS NFR BLD AUTO: 1.4 %
EOSINOPHIL # BLD AUTO: 0.14 X10*3/UL (ref 0–0.4)
EOSINOPHIL NFR BLD AUTO: 5 %
ERYTHROCYTE [DISTWIDTH] IN BLOOD BY AUTOMATED COUNT: 15.1 % (ref 11.5–14.5)
HCT VFR BLD AUTO: 34.7 % (ref 36–46)
HGB BLD-MCNC: 11.3 G/DL (ref 12–16)
IMM GRANULOCYTES # BLD AUTO: 0.02 X10*3/UL (ref 0–0.5)
IMM GRANULOCYTES NFR BLD AUTO: 0.7 % (ref 0–0.9)
LYMPHOCYTES # BLD AUTO: 0.97 X10*3/UL (ref 0.8–3)
LYMPHOCYTES NFR BLD AUTO: 34.9 %
MCH RBC QN AUTO: 32.9 PG (ref 26–34)
MCHC RBC AUTO-ENTMCNC: 32.6 G/DL (ref 32–36)
MCV RBC AUTO: 101 FL (ref 80–100)
MONOCYTES # BLD AUTO: 0.18 X10*3/UL (ref 0.05–0.8)
MONOCYTES NFR BLD AUTO: 6.5 %
NEUTROPHILS # BLD AUTO: 1.43 X10*3/UL (ref 1.6–5.5)
NEUTROPHILS NFR BLD AUTO: 51.5 %
NRBC BLD-RTO: ABNORMAL /100{WBCS}
OVALOCYTES BLD QL SMEAR: NORMAL
PLATELET # BLD AUTO: 364 X10*3/UL (ref 150–450)
RBC # BLD AUTO: 3.43 X10*6/UL (ref 4–5.2)
RBC MORPH BLD: NORMAL
WBC # BLD AUTO: 2.8 X10*3/UL (ref 4.4–11.3)

## 2023-12-04 PROCEDURE — 85025 COMPLETE CBC W/AUTO DIFF WBC: CPT

## 2023-12-04 PROCEDURE — 86300 IMMUNOASSAY TUMOR CA 15-3: CPT | Performed by: STUDENT IN AN ORGANIZED HEALTH CARE EDUCATION/TRAINING PROGRAM

## 2023-12-04 PROCEDURE — 80053 COMPREHEN METABOLIC PANEL: CPT | Performed by: STUDENT IN AN ORGANIZED HEALTH CARE EDUCATION/TRAINING PROGRAM

## 2023-12-04 PROCEDURE — 82378 CARCINOEMBRYONIC ANTIGEN: CPT | Performed by: STUDENT IN AN ORGANIZED HEALTH CARE EDUCATION/TRAINING PROGRAM

## 2023-12-04 PROCEDURE — 99214 OFFICE O/P EST MOD 30 MIN: CPT

## 2023-12-04 PROCEDURE — 1159F MED LIST DOCD IN RCRD: CPT

## 2023-12-04 PROCEDURE — 36415 COLL VENOUS BLD VENIPUNCTURE: CPT

## 2023-12-04 PROCEDURE — 1160F RVW MEDS BY RX/DR IN RCRD: CPT

## 2023-12-04 PROCEDURE — 99214 OFFICE O/P EST MOD 30 MIN: CPT | Mod: PO

## 2023-12-04 PROCEDURE — 1036F TOBACCO NON-USER: CPT

## 2023-12-04 PROCEDURE — 1125F AMNT PAIN NOTED PAIN PRSNT: CPT

## 2023-12-04 RX ORDER — LETROZOLE 2.5 MG/1
2.5 TABLET, FILM COATED ORAL DAILY
Qty: 90 TABLET | Refills: 2 | Status: SHIPPED | OUTPATIENT
Start: 2023-12-04

## 2023-12-04 ASSESSMENT — PAIN SCALES - GENERAL: PAINLEVEL: 4

## 2023-12-04 NOTE — PROGRESS NOTES
Breast Medical Oncology Clinic  Location: [place]    Visit Type: Follow-up Visit    ONC History:    She presented with a left-sided breast cancer in , requiring neoadjuvant chemotherapy. In 2006, she had resection of a 2 cm grade 2 infiltrating ductal carcinoma with 8 of 10 positive axillary lymph nodes. She was clinically staged T3 N1,  although would be N2 by today's standards. Hormone receptors are positive and HER-2/liliana negative   FAMILY HISTORY: She had a maternal aunt with a head and neck cancer at 78. She had a brother who  of lung cancer in the context of smoking.      TREATMENT SUMMARY:    Neoadjuvant chemotherapy included 4 cycles of Adriamycin and cyclophosphamide, followed by 4 cycles of paclitaxel completed  2006.    Left lumpectomy and axillary node dissection, 2006.    Left breast radiation, completed 2007.    Anastrozole was started 2007, recently as she stopped her medication either March or 2013.   Anastrozole ongoing with initial treatment started 2007, and medications stopped around 2013, and then restarted in 2013 -- she stopped sometime in 2020: First Relapse Date: Recurrence confirmed to bone- ER+   2020: New Treatment Plan: Letrozole started   2020-3-10: Radiation Therapy: SBRT to lumbar spine   2020-3-24: Chemotherapy: Palbociclib started   2020: Chemotherapy: Palbociclib decreased to 100mg due to marrow toxicity   2021: Chemotherapy: Ibrance on hold   2022: Chemotherapy: restart of Ibrance 100mg      Subjective: Interval History  Patient presents for follow up visit. States she is doing better, although she is unable to raise her right arm due to recent fall. States she will see the specialist in a few weeks and re-evaluate. Otherwise feeling well. Tolerating letrozole and ibrance.     Her energy is good. Denies issues with sleep or fatigue    She denies any vision changes or  headache issues, dizziness or loss of balance    She denies any shortness of breath, cough, or chest pain    She denies any skin lesions or masses, oral sores lesions or infections    She reports a normal appetite and normal bowel movements, and normal urination. Denies any new stomach pains, nausea, or vomiting      Social History  Angela A Ciganko  reports that she has never smoked. She has never used smokeless tobacco.  She  reports that she does not currently use alcohol.  She  reports no history of drug use.    ROS:     Review of Systems   All other systems reviewed and are negative.       Physical Examination:    /83   Pulse 78   Temp 36.1 °C (97 °F)   Resp 16   Wt 68.6 kg (151 lb 5.5 oz)   SpO2 97%   BMI 25.19 kg/m²     Physical Exam  Vitals and nursing note reviewed.   Constitutional:       General: She is not in acute distress.     Appearance: Normal appearance. She is not toxic-appearing.   HENT:      Head: Normocephalic and atraumatic.      Mouth/Throat:      Pharynx: Oropharynx is clear.   Eyes:      Extraocular Movements: Extraocular movements intact.      Conjunctiva/sclera: Conjunctivae normal.   Cardiovascular:      Rate and Rhythm: Normal rate and regular rhythm.   Pulmonary:      Effort: Pulmonary effort is normal. No respiratory distress.   Abdominal:      General: Abdomen is flat. Bowel sounds are normal.      Palpations: Abdomen is soft.   Musculoskeletal:         General: No swelling. Normal range of motion.      Cervical back: Normal range of motion.   Skin:     General: Skin is warm and dry.   Neurological:      General: No focal deficit present.      Mental Status: She is alert.   Psychiatric:         Mood and Affect: Mood normal.         Breast Examination:    Deferred    ECOG Performance Status:     [x] 0 Fully active, able to carry on all pre-disease performance without restriction  [] 1 Restricted in physically strenuous activity but ambulatory and able to carry out work of a  light or sedentary nature, e.g., light house work, office work  [] 2 Ambulatory and capable of all selfcare but unable to carry out any work activities; up and about more than 50% of waking hours  [] 3 Capable of only limited selfcare; confined to bed or chair more than 50% of waking hours  [] 4 Completely disabled; cannot carry on any selfcare; totally confined to bed or chair  [] 5 Dead     Results:    Labs:  No pertinent results since last visit.      Imaging:  No pertinent results since last visit.      Pathology:  No pertinent results since last visit.      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 Has been on letrozole and palbociclib 100 mg since 2020.       Patient continues to experienced reduced ROM in right arm- deferred bone scan at this time, but willing to obtain CT CAP- ordered today. She will obtain labs today.       Plan:    Surgical Plan: Left lumpectomy and axillary node dissection, October 2006.   Additional biopsy: Not indicated  Radiation Plan: Left breast radiation, completed January 2007; SBRT to lumbar spine in 2020  Additional staging scans/DEXA/echo: NM bone scan reviewed. DEXA reviewed.   Additional Path info (i.e Ki67, PDL1):Not indicated   Gene assays: Not indicated     Systemic treatment plan: Letrozole and ibrance 100 mg                   Intent: Palliative                Clinical trial: N/A                Endocrine therapy: Progressed on anastrozole. On letrozole since 2020                HER2 treatment: Not indicated                Targeted agents: Ibrance                Chemotherapy: 4 cycles of Adriamycin and cyclophosphamide, followed by 4 cycles of paclitaxel completed September 2006.                 BMA: We discussed the role of zoledronic acid as supportive therapy in light of osseous disease to minimize the risk and delay skeletal related events. We discussed that this would be given as  an infusion every 3 months. We discussed the  toxicities that may be associated with this including the risk of osteonecrosis of the jaw. I have recommended an dental evaluation before this is initiated. She was given written information on Zoledronic  acid for her review and we will plan to initiate this at her follow-up visit once she has received clearance from her dentist. Will discuss again at her next visit.      Access: Not indicated  Supportive meds: Not indicated  Genetic testing: Will discuss at future visit  Fertility preservation: Not indicated  Other active problems/orders:      Osteopenia: Zometa discussed as above. We discussed general recommendations for bone loss prevention which include 8707-2483 mg of calcium intake per day for adults >50yrs, and no history of renal calculi;  800-1000 IU of vitamin D3 per day for adults >50yrs, and no history of renal calculi. Weight bearing exercise. Discontinue smoking. Avoid excessive use of caffeine, soft drinks, and alcoholic beverages. In addition, balance training and fall prevention  programs can help reduce the risk of fractures.      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 We discussed extensively that my practice is to involve patient's PCP in management of non-oncologic issues. We agreed on these expectations moving forward.      Surveillance plan: Will re-order CT CAP; declined bone scan at this time. monthly labs and TM.    Follow-up: 1 month    Patient expressed understanding of the plan outlined above. She had ample time to ask questions. She understands she can contact us should she have additional questions or issues arise in the interim.      At least 20 minutes of direct consultation was spent with the patient today reviewing her cancer care plan, educating and answering questions regarding ongoing follow up, greater than 50% in counseling and coordination of care.

## 2023-12-05 ENCOUNTER — TELEPHONE (OUTPATIENT)
Dept: HEMATOLOGY/ONCOLOGY | Facility: CLINIC | Age: 83
End: 2023-12-05
Payer: MEDICARE

## 2023-12-05 LAB
ALBUMIN SERPL BCP-MCNC: 4.7 G/DL (ref 3.4–5)
ALP SERPL-CCNC: 34 U/L (ref 33–136)
ALT SERPL W P-5'-P-CCNC: 10 U/L (ref 7–45)
ANION GAP SERPL CALC-SCNC: 14 MMOL/L (ref 10–20)
AST SERPL W P-5'-P-CCNC: 15 U/L (ref 9–39)
BILIRUB SERPL-MCNC: 0.7 MG/DL (ref 0–1.2)
BUN SERPL-MCNC: 24 MG/DL (ref 6–23)
CALCIUM SERPL-MCNC: 10.1 MG/DL (ref 8.6–10.6)
CANCER AG27-29 SERPL-ACNC: 42.7 U/ML (ref 0–38.6)
CEA SERPL-MCNC: 2.8 UG/L
CHLORIDE SERPL-SCNC: 103 MMOL/L (ref 98–107)
CO2 SERPL-SCNC: 25 MMOL/L (ref 21–32)
CREAT SERPL-MCNC: 1.19 MG/DL (ref 0.5–1.05)
GFR SERPL CREATININE-BSD FRML MDRD: 45 ML/MIN/1.73M*2
GLUCOSE SERPL-MCNC: 94 MG/DL (ref 74–99)
POTASSIUM SERPL-SCNC: 4.9 MMOL/L (ref 3.5–5.3)
PROT SERPL-MCNC: 7.1 G/DL (ref 6.4–8.2)
SODIUM SERPL-SCNC: 137 MMOL/L (ref 136–145)

## 2023-12-12 ENCOUNTER — ANCILLARY PROCEDURE (OUTPATIENT)
Dept: RADIOLOGY | Facility: CLINIC | Age: 83
End: 2023-12-12
Payer: MEDICARE

## 2023-12-12 DIAGNOSIS — N63.0 BREAST MASS IN FEMALE: ICD-10-CM

## 2023-12-12 DIAGNOSIS — C79.51 CARCINOMA OF LEFT BREAST METASTATIC TO BONE (MULTI): ICD-10-CM

## 2023-12-12 DIAGNOSIS — Z17.0 MALIGNANT NEOPLASM OF BREAST IN FEMALE, ESTROGEN RECEPTOR POSITIVE, UNSPECIFIED LATERALITY, UNSPECIFIED SITE OF BREAST (MULTI): ICD-10-CM

## 2023-12-12 DIAGNOSIS — C50.919 MALIGNANT NEOPLASM OF BREAST IN FEMALE, ESTROGEN RECEPTOR POSITIVE, UNSPECIFIED LATERALITY, UNSPECIFIED SITE OF BREAST (MULTI): ICD-10-CM

## 2023-12-12 DIAGNOSIS — C50.912 CARCINOMA OF LEFT BREAST METASTATIC TO BONE (MULTI): ICD-10-CM

## 2023-12-12 PROCEDURE — 74177 CT ABD & PELVIS W/CONTRAST: CPT | Performed by: RADIOLOGY

## 2023-12-12 PROCEDURE — 2550000001 HC RX 255 CONTRASTS

## 2023-12-12 PROCEDURE — 71260 CT THORAX DX C+: CPT | Performed by: RADIOLOGY

## 2023-12-12 PROCEDURE — 74177 CT ABD & PELVIS W/CONTRAST: CPT

## 2023-12-12 RX ADMIN — IOHEXOL 70 ML: 350 INJECTION, SOLUTION INTRAVENOUS at 14:41

## 2023-12-19 ENCOUNTER — OFFICE VISIT (OUTPATIENT)
Dept: PRIMARY CARE | Facility: CLINIC | Age: 83
End: 2023-12-19
Payer: MEDICARE

## 2023-12-19 VITALS
SYSTOLIC BLOOD PRESSURE: 142 MMHG | BODY MASS INDEX: 25.49 KG/M2 | DIASTOLIC BLOOD PRESSURE: 74 MMHG | HEIGHT: 65 IN | TEMPERATURE: 96.5 F | HEART RATE: 75 BPM | OXYGEN SATURATION: 96 % | WEIGHT: 153 LBS

## 2023-12-19 DIAGNOSIS — M54.32 LEFT SIDED SCIATICA: Primary | ICD-10-CM

## 2023-12-19 DIAGNOSIS — Z79.1 NSAID LONG-TERM USE: ICD-10-CM

## 2023-12-19 PROCEDURE — 1160F RVW MEDS BY RX/DR IN RCRD: CPT | Performed by: FAMILY MEDICINE

## 2023-12-19 PROCEDURE — 1036F TOBACCO NON-USER: CPT | Performed by: FAMILY MEDICINE

## 2023-12-19 PROCEDURE — 1125F AMNT PAIN NOTED PAIN PRSNT: CPT | Performed by: FAMILY MEDICINE

## 2023-12-19 PROCEDURE — 99214 OFFICE O/P EST MOD 30 MIN: CPT | Performed by: FAMILY MEDICINE

## 2023-12-19 PROCEDURE — 1159F MED LIST DOCD IN RCRD: CPT | Performed by: FAMILY MEDICINE

## 2023-12-19 RX ORDER — OMEPRAZOLE 20 MG/1
20 CAPSULE, DELAYED RELEASE ORAL DAILY
Qty: 90 CAPSULE | Refills: 1 | Status: SHIPPED | OUTPATIENT
Start: 2023-12-19 | End: 2024-03-07 | Stop reason: ALTCHOICE

## 2023-12-19 RX ORDER — PREGABALIN 50 MG/1
50 CAPSULE ORAL 2 TIMES DAILY
Qty: 30 CAPSULE | Refills: 0 | Status: SHIPPED | OUTPATIENT
Start: 2023-12-19 | End: 2024-01-02 | Stop reason: WASHOUT

## 2023-12-19 RX ORDER — METHYLPREDNISOLONE 4 MG/1
TABLET ORAL
Qty: 21 TABLET | Refills: 0 | Status: SHIPPED | OUTPATIENT
Start: 2023-12-19 | End: 2023-12-19 | Stop reason: WASHOUT

## 2023-12-19 ASSESSMENT — PATIENT HEALTH QUESTIONNAIRE - PHQ9
2. FEELING DOWN, DEPRESSED OR HOPELESS: NOT AT ALL
SUM OF ALL RESPONSES TO PHQ9 QUESTIONS 1 & 2: 0
1. LITTLE INTEREST OR PLEASURE IN DOING THINGS: NOT AT ALL

## 2023-12-19 ASSESSMENT — COLUMBIA-SUICIDE SEVERITY RATING SCALE - C-SSRS
2. HAVE YOU ACTUALLY HAD ANY THOUGHTS OF KILLING YOURSELF?: NO
6. HAVE YOU EVER DONE ANYTHING, STARTED TO DO ANYTHING, OR PREPARED TO DO ANYTHING TO END YOUR LIFE?: NO
1. IN THE PAST MONTH, HAVE YOU WISHED YOU WERE DEAD OR WISHED YOU COULD GO TO SLEEP AND NOT WAKE UP?: NO

## 2023-12-19 NOTE — PATIENT INSTRUCTIONS
Obtain bone scan as scheduled  Add omeprazole for GI protection  Trial of pregabalin 50 mg twice a day  Virtual appointment in 2 2 to 3 weeks

## 2023-12-19 NOTE — PROGRESS NOTES
"83-year-old female with a history of breast cancer with bone mets status post gamma knife treatments to thoracic or lumbar spine here complaining of left buttock pain radiating to her foot for 1 month without fevers chills sweats incontinence of urine incontinence of stool.  Recent CT imaging ordered through heme-onc revealed mild to moderate disc space narrowing and endplate osteophytosis throughout the thoracic spine with wedge deformity at T1 and T3 and compression fracture at T12 seen on previous scans.  There is also slight sclerotic arthritic changes in both SI joints with hypertrophic arthritic changes throughout the lumbar spine as well as previous L2 compression fracture without new lumbar compression fracture.    She has a history of methylprednisolone \"allergy with rash after taking methylprednisolone    She is currently taking ibuprofen 600 mg 3 times a day as needed with improvement  Denies abdominal pain heartburn hematochezia melena weight loss night sweats      Is pending repeat bone scan            /74   Pulse 75   Temp 35.8 °C (96.5 °F)   Ht 1.651 m (5' 5\")   Wt 69.4 kg (153 lb)   SpO2 96%   BMI 25.46 kg/m²       Skin without pallor or cyanosis  Abdomen soft nondistended nontender without organomegaly or mass  No midline thoracic parathoracic midline lumbar paralumbar tenderness or spasm  Positive leg raise left leg negative leg raise right  Negative leg rolling  Quadriceps hamstrings foot dorsiflexion foot plantarflexion 4 out of 5 bilaterally  Patella and Achilles reflexes equally diminished  Extremities without erythema edema Homans or cord  "

## 2023-12-21 NOTE — TELEPHONE ENCOUNTER
Called [employer] and spoke with Kesha.  Confirmed renewal application has been received and they have everything they need.  Applications will start processing in the new year.

## 2023-12-23 DIAGNOSIS — G89.29 CHRONIC PAIN OF LEFT KNEE: ICD-10-CM

## 2023-12-23 DIAGNOSIS — M25.562 CHRONIC PAIN OF LEFT KNEE: ICD-10-CM

## 2023-12-26 RX ORDER — IBUPROFEN 600 MG/1
TABLET ORAL
Qty: 360 TABLET | Refills: 1 | Status: SHIPPED | OUTPATIENT
Start: 2023-12-26

## 2024-01-02 ENCOUNTER — TELEMEDICINE (OUTPATIENT)
Dept: PRIMARY CARE | Facility: CLINIC | Age: 84
End: 2024-01-02
Payer: MEDICARE

## 2024-01-02 DIAGNOSIS — C79.51 CARCINOMA OF LEFT BREAST METASTATIC TO BONE (MULTI): ICD-10-CM

## 2024-01-02 DIAGNOSIS — M48.061 SPINAL STENOSIS OF LUMBAR REGION, UNSPECIFIED WHETHER NEUROGENIC CLAUDICATION PRESENT: Primary | ICD-10-CM

## 2024-01-02 DIAGNOSIS — C50.912 CARCINOMA OF LEFT BREAST METASTATIC TO BONE (MULTI): ICD-10-CM

## 2024-01-02 PROCEDURE — 99442 PR PHYS/QHP TELEPHONE EVALUATION 11-20 MIN: CPT | Performed by: FAMILY MEDICINE

## 2024-01-02 NOTE — PATIENT INSTRUCTIONS
Plan is at this point to obtain bone scan to identify possible metastatic disease responsible for her pain.  Anticipate we will need to her to follow-up with pain management we will do this once results of bone scan are known

## 2024-01-02 NOTE — PROGRESS NOTES
"In light of the recent pandemic related to Coronavirus causing COVID-19 illness, and in  accordance with CDC guidelines which encourage social distancing, I have spoken with my  patient via telemedicine. They have no symptomatology which would necessitate an in-person visit.  Limitations of this modality  were explained to patient.  Patient understands these limitations and consents to visit.    Length of discussion 13  minutes    83-year-old female with a history of breast cancer with bone mets status post gamma knife treatments to thoracic lumbar spine here complaining of left buttock pain radiating to her foot for 1 month without fevers chills sweats incontinence of urine incontinence of stool.  Recent CT imaging ordered through heme-onc revealed mild to moderate disc space narrowing and endplate osteophytosis throughout the thoracic spine with wedge deformity at T1 and T3 and compression fracture at T12 seen on previous scans.  There is also slight sclerotic arthritic changes in both SI joints with hypertrophic arthritic changes throughout the lumbar spine as well as previous L2 compression fracture without new lumbar compression fracture.    She has a history of methylprednisolone \"allergy with rash after taking methylprednisolone    She is currently taking ibuprofen 600 mg 3 times a day as needed with improvement  Denies abdominal pain heartburn hematochezia melena weight loss night sweats      Is pending repeat bone scan she has not scheduled  Was placed on Lyrica 50 mg twice a day.  This made her dizzy and did not improve her pain she prefers not to be on it she appears to get some pain relief with ibuprofen instead.    "

## 2024-01-05 ENCOUNTER — ANCILLARY PROCEDURE (OUTPATIENT)
Dept: RADIOLOGY | Facility: CLINIC | Age: 84
End: 2024-01-05
Payer: MEDICARE

## 2024-01-05 DIAGNOSIS — C50.912 MALIGNANT NEOPLASM OF UNSPECIFIED SITE OF LEFT FEMALE BREAST (MULTI): ICD-10-CM

## 2024-01-05 PROCEDURE — 3430000001 HC RX 343 DIAGNOSTIC RADIOPHARMACEUTICALS

## 2024-01-05 PROCEDURE — A9503 TC99M MEDRONATE: HCPCS

## 2024-01-05 PROCEDURE — 78306 BONE IMAGING WHOLE BODY: CPT

## 2024-01-05 PROCEDURE — 78306 BONE IMAGING WHOLE BODY: CPT | Performed by: STUDENT IN AN ORGANIZED HEALTH CARE EDUCATION/TRAINING PROGRAM

## 2024-01-05 RX ADMIN — TECHNETIUM TC 99M MEDRONATE 26.6 MILLICURIE: 25 INJECTION, POWDER, FOR SOLUTION INTRAVENOUS at 10:40

## 2024-01-11 ENCOUNTER — TELEPHONE (OUTPATIENT)
Dept: PRIMARY CARE | Facility: CLINIC | Age: 84
End: 2024-01-11
Payer: MEDICARE

## 2024-01-11 NOTE — TELEPHONE ENCOUNTER
Patient left a message asking for the results of a bone scan performed on Friday 01/05/2024. There is not even an order for a bone scan in the chart. I tried to call and left a message to call back.

## 2024-01-15 ENCOUNTER — OFFICE VISIT (OUTPATIENT)
Dept: HEMATOLOGY/ONCOLOGY | Facility: CLINIC | Age: 84
End: 2024-01-15
Payer: MEDICARE

## 2024-01-15 VITALS
OXYGEN SATURATION: 98 % | DIASTOLIC BLOOD PRESSURE: 75 MMHG | HEART RATE: 73 BPM | SYSTOLIC BLOOD PRESSURE: 161 MMHG | WEIGHT: 151.13 LBS | BODY MASS INDEX: 25.15 KG/M2 | TEMPERATURE: 97 F | RESPIRATION RATE: 16 BRPM

## 2024-01-15 DIAGNOSIS — C50.912 CARCINOMA OF LEFT BREAST METASTATIC TO BONE (MULTI): ICD-10-CM

## 2024-01-15 DIAGNOSIS — Z17.0 MALIGNANT NEOPLASM OF BREAST IN FEMALE, ESTROGEN RECEPTOR POSITIVE, UNSPECIFIED LATERALITY, UNSPECIFIED SITE OF BREAST (MULTI): ICD-10-CM

## 2024-01-15 DIAGNOSIS — N63.0 BREAST MASS IN FEMALE: ICD-10-CM

## 2024-01-15 DIAGNOSIS — C50.919 MALIGNANT NEOPLASM OF BREAST IN FEMALE, ESTROGEN RECEPTOR POSITIVE, UNSPECIFIED LATERALITY, UNSPECIFIED SITE OF BREAST (MULTI): ICD-10-CM

## 2024-01-15 DIAGNOSIS — C79.51 CARCINOMA OF LEFT BREAST METASTATIC TO BONE (MULTI): ICD-10-CM

## 2024-01-15 PROBLEM — S06.0XAD: Status: ACTIVE | Noted: 2024-01-15

## 2024-01-15 PROBLEM — Z86.19 HISTORY OF VARICELLA: Status: ACTIVE | Noted: 2024-01-15
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
## 2024-01-15 PROBLEM — U07.1 DISEASE DUE TO SEVERE ACUTE RESPIRATORY SYNDROME CORONAVIRUS 2 (SARS-COV-2): Status: ACTIVE | Noted: 2023-04-05

## 2024-01-15 PROBLEM — J06.9 ACUTE UPPER RESPIRATORY INFECTION: Status: ACTIVE | Noted: 2023-11-27

## 2024-01-15 PROBLEM — M25.569 KNEE PAIN: Status: ACTIVE | Noted: 2023-04-05

## 2024-01-15 PROBLEM — M54.9 BACK PAIN: Status: ACTIVE | Noted: 2023-04-05

## 2024-01-15 PROBLEM — M25.539 PAIN IN WRIST: Status: ACTIVE | Noted: 2024-01-15

## 2024-01-15 PROBLEM — M19.90 UNSPECIFIED OSTEOARTHRITIS, UNSPECIFIED SITE: Status: ACTIVE | Noted: 2023-04-19

## 2024-01-15 PROBLEM — I77.810 THORACIC AORTIC ECTASIA (CMS-HCC): Status: ACTIVE | Noted: 2023-07-20

## 2024-01-15 PROBLEM — Z85.3 HISTORY OF MALIGNANT NEOPLASM OF BREAST: Status: ACTIVE | Noted: 2024-01-15

## 2024-01-15 PROBLEM — Z78.0 ASYMPTOMATIC MENOPAUSAL STATE: Status: ACTIVE | Noted: 2023-04-19

## 2024-01-15 PROBLEM — Z86.19 HISTORY OF MEASLES: Status: ACTIVE | Noted: 2024-01-15

## 2024-01-15 PROBLEM — S20.219A CONTUSION OF CHEST WALL: Status: ACTIVE | Noted: 2023-04-05

## 2024-01-15 PROBLEM — S42.209A CLOSED FRACTURE OF PROXIMAL END OF HUMERUS: Status: ACTIVE | Noted: 2023-11-07

## 2024-01-15 PROBLEM — Z51.11 ENCOUNTER FOR ANTINEOPLASTIC CHEMOTHERAPY: Status: ACTIVE | Noted: 2022-12-14

## 2024-01-15 PROBLEM — G62.9 PERIPHERAL NERVE DISEASE: Status: ACTIVE | Noted: 2023-04-05

## 2024-01-15 PROBLEM — M25.511 PAIN OF RIGHT SHOULDER REGION: Status: ACTIVE | Noted: 2023-10-24

## 2024-01-15 PROCEDURE — 99215 OFFICE O/P EST HI 40 MIN: CPT | Performed by: STUDENT IN AN ORGANIZED HEALTH CARE EDUCATION/TRAINING PROGRAM

## 2024-01-15 PROCEDURE — 1125F AMNT PAIN NOTED PAIN PRSNT: CPT | Performed by: STUDENT IN AN ORGANIZED HEALTH CARE EDUCATION/TRAINING PROGRAM

## 2024-01-15 PROCEDURE — 1159F MED LIST DOCD IN RCRD: CPT | Performed by: STUDENT IN AN ORGANIZED HEALTH CARE EDUCATION/TRAINING PROGRAM

## 2024-01-15 PROCEDURE — 1036F TOBACCO NON-USER: CPT | Performed by: STUDENT IN AN ORGANIZED HEALTH CARE EDUCATION/TRAINING PROGRAM

## 2024-01-15 PROCEDURE — 1160F RVW MEDS BY RX/DR IN RCRD: CPT | Performed by: STUDENT IN AN ORGANIZED HEALTH CARE EDUCATION/TRAINING PROGRAM

## 2024-01-15 NOTE — PROGRESS NOTES
"  [facility]  Location: [facility]    Visit Type: Follow-up Visit    ONC History:    She presented with a left-sided breast cancer in , requiring neoadjuvant chemotherapy. In 2006, she had resection of a 2 cm grade 2 infiltrating ductal carcinoma with 8 of 10 positive axillary lymph nodes. She was clinically staged T3 N1,  although would be N2 by today's standards. Hormone receptors are positive and HER-2/liliana negative   FAMILY HISTORY: She had a maternal aunt with a head and neck cancer at 78. She had a brother who  of lung cancer in the context of smoking.      TREATMENT SUMMARY:    Neoadjuvant chemotherapy included 4 cycles of Adriamycin and cyclophosphamide, followed by 4 cycles of paclitaxel completed  2006.    Left lumpectomy and axillary node dissection, 2006.    Left breast radiation, completed 2007.    Anastrozole was started 2007, recently as she stopped her medication either March or 2013.   Anastrozole ongoing with initial treatment started 2007, and medications stopped around 2013, and then restarted in 2013 -- she stopped sometime in 2020: First Relapse Date: Recurrence confirmed to bone- ER+   2020: New Treatment Plan: Letrozole started   2020-3-10: Radiation Therapy: SBRT to lumbar spine   2020-3-24: Chemotherapy: Palbociclib started   2020: Chemotherapy: Palbociclib decreased to 100mg due to marrow toxicity   2021: Chemotherapy: Ibrance on hold   2022: Chemotherapy: restart of Ibrance 100mg      Subjective: Interval History    Patient presents for follow up visit. Tolerating letrozole and ibrance.     Reports she is having pain in the front L thigh. Describes it as throbbing. Reports she has previously told this is sciatica pain. She declines physical therapy, reports it \"was a waste\" in the past. She was referred to pain management by another doctor and declined as well. " She wants to talk to her orthopedic doctor.     Otherwise, feels unchanged and remains at baseline.     Social History  Angela A Ciganko  reports that she has never smoked. She has never used smokeless tobacco.  She  reports that she does not currently use alcohol.  She  reports no history of drug use.    ROS:     Review of Systems   All other systems reviewed and are negative.       Physical Examination:    /75   Pulse 73   Temp 36.1 °C (97 °F)   Resp 16   Wt 68.6 kg (151 lb 2 oz)   SpO2 98%   BMI 25.15 kg/m²     Physical Exam  Vitals and nursing note reviewed.   Constitutional:       General: She is not in acute distress.     Appearance: Normal appearance. She is not toxic-appearing.   HENT:      Head: Normocephalic and atraumatic.      Mouth/Throat:      Pharynx: Oropharynx is clear.   Eyes:      Extraocular Movements: Extraocular movements intact.      Conjunctiva/sclera: Conjunctivae normal.   Cardiovascular:      Rate and Rhythm: Normal rate and regular rhythm.   Pulmonary:      Effort: Pulmonary effort is normal. No respiratory distress.   Abdominal:      General: Abdomen is flat. Bowel sounds are normal.      Palpations: Abdomen is soft.   Musculoskeletal:         General: No swelling. Normal range of motion.      Cervical back: Normal range of motion.   Skin:     General: Skin is warm and dry.   Neurological:      General: No focal deficit present.      Mental Status: She is alert.   Psychiatric:         Mood and Affect: Mood normal.       Breast Examination:    Deferred    ECOG Performance Status:     [x] 0 Fully active, able to carry on all pre-disease performance without restriction  [] 1 Restricted in physically strenuous activity but ambulatory and able to carry out work of a light or sedentary nature, e.g., light house work, office work  [] 2 Ambulatory and capable of all selfcare but unable to carry out any work activities; up and about more than 50% of waking hours  [] 3 Capable of only  limited selfcare; confined to bed or chair more than 50% of waking hours  [] 4 Completely disabled; cannot carry on any selfcare; totally confined to bed or chair  [] 5 Dead     Results:    Labs:  No pertinent results since last visit.      Imaging:  No pertinent results since last visit.      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 Has been on letrozole and palbociclib 100 mg since 2020.     CT CAP reviewed in detail with patient. Stable findings. Tolerating treatment well. Reports left thigh throbbing, previously told this was sciatica. No red flag s/s including back pain, numbness or incontinence. She declines further work-up at this time and would like to discuss with her orthopedic provider. Declines 3- month staging scans at this time and wants to call us when she is ready to order it. Reaffirmed that we will not proceed until she notifies us.     Plan:    Surgical Plan: Left lumpectomy and axillary node dissection, October 2006.   Additional biopsy: Not indicated  Radiation Plan: Left breast radiation, completed January 2007; SBRT to lumbar spine in 2020  Additional staging scans/DEXA/echo: CT CAP reviewed DEXA reviewed.   Additional Path info (i.e Ki67, PDL1):Not indicated   Gene assays: Not indicated     Systemic treatment plan: Letrozole and ibrance 100 mg                   Intent: Palliative                Clinical trial: N/A                Endocrine therapy: Progressed on anastrozole. On letrozole since 2020                HER2 treatment: Not indicated                Targeted agents: Ibrance                Chemotherapy: 4 cycles of Adriamycin and cyclophosphamide, followed by 4 cycles of paclitaxel completed September 2006.                 BMA: We discussed the role of zoledronic acid as supportive therapy in light of osseous disease to minimize the risk and delay skeletal related events. We discussed that  this would be given as  an infusion every 3 months. We discussed the toxicities that may be associated with this including the risk of osteonecrosis of the jaw. I have recommended an dental evaluation before this is initiated. She was given written information on Zoledronic  acid for her review and we will plan to initiate this at her follow-up visit once she has received clearance from her dentist. Will discuss again at her next visit.      Access: Not indicated  Supportive meds: Not indicated  Genetic testing: Will discuss at future visit  Fertility preservation: Not indicated  Other active problems/orders:      Osteopenia: Zometa discussed as above. We discussed general recommendations for bone loss prevention which include 8883-5725 mg of calcium intake per day for adults >50yrs, and no history of renal calculi;  800-1000 IU of vitamin D3 per day for adults >50yrs, and no history of renal calculi. Weight bearing exercise. Discontinue smoking. Avoid excessive use of caffeine, soft drinks, and alcoholic beverages. In addition, balance training and fall prevention  programs can help reduce the risk of fractures.      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 We discussed extensively that my practice is to involve patient's PCP in management of non-oncologic issues. We agreed on these expectations moving forward.      Surveillance plan: Monthly labs and TM. Declined further scans at this time until she calls us back.     Follow-up: 3 months    Patient expressed understanding of the plan outlined above. She had ample time to ask questions. She understands she can contact us should she have additional questions or issues arise in the interim.

## 2024-01-22 ENCOUNTER — LAB (OUTPATIENT)
Dept: LAB | Facility: LAB | Age: 84
End: 2024-01-22
Payer: MEDICARE

## 2024-01-22 ENCOUNTER — OFFICE VISIT (OUTPATIENT)
Dept: PRIMARY CARE | Facility: CLINIC | Age: 84
End: 2024-01-22
Payer: MEDICARE

## 2024-01-22 VITALS
SYSTOLIC BLOOD PRESSURE: 130 MMHG | OXYGEN SATURATION: 98 % | BODY MASS INDEX: 24.83 KG/M2 | TEMPERATURE: 96.4 F | HEIGHT: 65 IN | DIASTOLIC BLOOD PRESSURE: 63 MMHG | WEIGHT: 149 LBS | HEART RATE: 73 BPM

## 2024-01-22 DIAGNOSIS — M48.061 SPINAL STENOSIS OF LUMBAR REGION, UNSPECIFIED WHETHER NEUROGENIC CLAUDICATION PRESENT: Primary | ICD-10-CM

## 2024-01-22 DIAGNOSIS — Z17.0 MALIGNANT NEOPLASM OF BREAST IN FEMALE, ESTROGEN RECEPTOR POSITIVE, UNSPECIFIED LATERALITY, UNSPECIFIED SITE OF BREAST (MULTI): ICD-10-CM

## 2024-01-22 DIAGNOSIS — S22.000D COMPRESSION FRACTURE OF THORACIC VERTEBRA WITH ROUTINE HEALING, UNSPECIFIED THORACIC VERTEBRAL LEVEL, SUBSEQUENT ENCOUNTER: ICD-10-CM

## 2024-01-22 DIAGNOSIS — C50.919 MALIGNANT NEOPLASM OF BREAST IN FEMALE, ESTROGEN RECEPTOR POSITIVE, UNSPECIFIED LATERALITY, UNSPECIFIED SITE OF BREAST (MULTI): ICD-10-CM

## 2024-01-22 DIAGNOSIS — J01.01 ACUTE RECURRENT MAXILLARY SINUSITIS: ICD-10-CM

## 2024-01-22 LAB
ALBUMIN SERPL BCP-MCNC: 4.6 G/DL (ref 3.4–5)
ALP SERPL-CCNC: 33 U/L (ref 33–136)
ALT SERPL W P-5'-P-CCNC: 10 U/L (ref 7–45)
ANION GAP SERPL CALC-SCNC: 14 MMOL/L (ref 10–20)
AST SERPL W P-5'-P-CCNC: 14 U/L (ref 9–39)
BASOPHILS # BLD MANUAL: 0.03 X10*3/UL (ref 0–0.1)
BASOPHILS NFR BLD MANUAL: 1.7 %
BILIRUB SERPL-MCNC: 0.4 MG/DL (ref 0–1.2)
BUN SERPL-MCNC: 21 MG/DL (ref 6–23)
BURR CELLS BLD QL SMEAR: ABNORMAL
CALCIUM SERPL-MCNC: 9.8 MG/DL (ref 8.6–10.6)
CANCER AG27-29 SERPL-ACNC: 18.3 U/ML (ref 0–38.6)
CEA SERPL-MCNC: 2.9 UG/L
CHLORIDE SERPL-SCNC: 103 MMOL/L (ref 98–107)
CO2 SERPL-SCNC: 25 MMOL/L (ref 21–32)
CREAT SERPL-MCNC: 1.01 MG/DL (ref 0.5–1.05)
EGFRCR SERPLBLD CKD-EPI 2021: 55 ML/MIN/1.73M*2
EOSINOPHIL # BLD MANUAL: 0.05 X10*3/UL (ref 0–0.4)
EOSINOPHIL NFR BLD MANUAL: 2.6 %
ERYTHROCYTE [DISTWIDTH] IN BLOOD BY AUTOMATED COUNT: 15.9 % (ref 11.5–14.5)
GLUCOSE SERPL-MCNC: 94 MG/DL (ref 74–99)
HCT VFR BLD AUTO: 33.6 % (ref 36–46)
HGB BLD-MCNC: 11.2 G/DL (ref 12–16)
IMM GRANULOCYTES # BLD AUTO: 0 X10*3/UL (ref 0–0.5)
IMM GRANULOCYTES NFR BLD AUTO: 0 % (ref 0–0.9)
LYMPHOCYTES # BLD MANUAL: 0.9 X10*3/UL (ref 0.8–3)
LYMPHOCYTES NFR BLD MANUAL: 44.8 %
MCH RBC QN AUTO: 32.7 PG (ref 26–34)
MCHC RBC AUTO-ENTMCNC: 33.3 G/DL (ref 32–36)
MCV RBC AUTO: 98 FL (ref 80–100)
MONOCYTES # BLD MANUAL: 0.28 X10*3/UL (ref 0.05–0.8)
MONOCYTES NFR BLD MANUAL: 13.8 %
NEUTS SEG # BLD MANUAL: 0.74 X10*3/UL (ref 1.6–5)
NEUTS SEG NFR BLD MANUAL: 37.1 %
NRBC BLD-RTO: 0 /100 WBCS (ref 0–0)
PLATELET # BLD AUTO: 184 X10*3/UL (ref 150–450)
POTASSIUM SERPL-SCNC: 4.3 MMOL/L (ref 3.5–5.3)
PROT SERPL-MCNC: 7.3 G/DL (ref 6.4–8.2)
RBC # BLD AUTO: 3.42 X10*6/UL (ref 4–5.2)
RBC MORPH BLD: ABNORMAL
SODIUM SERPL-SCNC: 138 MMOL/L (ref 136–145)
TOTAL CELLS COUNTED BLD: 116
WBC # BLD AUTO: 2 X10*3/UL (ref 4.4–11.3)

## 2024-01-22 PROCEDURE — 82378 CARCINOEMBRYONIC ANTIGEN: CPT

## 2024-01-22 PROCEDURE — 1160F RVW MEDS BY RX/DR IN RCRD: CPT | Performed by: FAMILY MEDICINE

## 2024-01-22 PROCEDURE — 1125F AMNT PAIN NOTED PAIN PRSNT: CPT | Performed by: FAMILY MEDICINE

## 2024-01-22 PROCEDURE — 36415 COLL VENOUS BLD VENIPUNCTURE: CPT

## 2024-01-22 PROCEDURE — 99214 OFFICE O/P EST MOD 30 MIN: CPT | Performed by: FAMILY MEDICINE

## 2024-01-22 PROCEDURE — 85027 COMPLETE CBC AUTOMATED: CPT

## 2024-01-22 PROCEDURE — 1159F MED LIST DOCD IN RCRD: CPT | Performed by: FAMILY MEDICINE

## 2024-01-22 PROCEDURE — 1036F TOBACCO NON-USER: CPT | Performed by: FAMILY MEDICINE

## 2024-01-22 PROCEDURE — 80053 COMPREHEN METABOLIC PANEL: CPT

## 2024-01-22 PROCEDURE — 85007 BL SMEAR W/DIFF WBC COUNT: CPT

## 2024-01-22 PROCEDURE — 86300 IMMUNOASSAY TUMOR CA 15-3: CPT

## 2024-01-22 RX ORDER — CEFDINIR 300 MG/1
300 CAPSULE ORAL 2 TIMES DAILY
Qty: 20 CAPSULE | Refills: 0 | Status: SHIPPED | OUTPATIENT
Start: 2024-01-22 | End: 2024-02-01

## 2024-01-22 ASSESSMENT — COLUMBIA-SUICIDE SEVERITY RATING SCALE - C-SSRS
6. HAVE YOU EVER DONE ANYTHING, STARTED TO DO ANYTHING, OR PREPARED TO DO ANYTHING TO END YOUR LIFE?: NO
2. HAVE YOU ACTUALLY HAD ANY THOUGHTS OF KILLING YOURSELF?: NO
1. IN THE PAST MONTH, HAVE YOU WISHED YOU WERE DEAD OR WISHED YOU COULD GO TO SLEEP AND NOT WAKE UP?: NO

## 2024-01-22 ASSESSMENT — PATIENT HEALTH QUESTIONNAIRE - PHQ9
SUM OF ALL RESPONSES TO PHQ9 QUESTIONS 1 & 2: 0
1. LITTLE INTEREST OR PLEASURE IN DOING THINGS: NOT AT ALL
2. FEELING DOWN, DEPRESSED OR HOPELESS: NOT AT ALL

## 2024-01-22 NOTE — PROGRESS NOTES
"83-year-old female with a history of breast cancer with bone mets status post gamma knife treatments to thoracic or lumbar spine here complaining of left buttock pain radiating to her foot for 1 month without fevers chills sweats incontinence of urine incontinence of stool.  Recent CT imaging ordered through heme-onc revealed mild to moderate disc space narrowing and endplate osteophytosis throughout the thoracic spine with wedge deformity at T1 and T3 and compression fracture at T12 seen on previous scans.  There is also slight sclerotic arthritic changes in both SI joints with hypertrophic arthritic changes throughout the lumbar spine as well as previous L2 compression fracture without new lumbar compression fracture.  Bone scan done on 1/5/2024 showed no evidence of osseous metastatic disease particularly at the L5 vertebral body.  There is decrease in radiotracer uptake at T12 and L2 corresponding to compression fractures .  On 12/19/2023 was placed on pregabalin 50 mg 2 times a day but she saw no improvement in her pain and complained of medication side effect (dizziness) so this was discontinued.       C/o nasal congestion with yellow rhinorrhea x 3 weeks without SOB, wheezing          /63   Pulse 73   Temp 35.8 °C (96.4 °F)   Ht 1.651 m (5' 5\")   Wt 67.6 kg (149 lb)   SpO2 98%   BMI 24.79 kg/m²       Skin without pallor or cyanosis    Abdomen soft nondistended nontender without organomegaly or mass  No midline thoracic parathoracic midline lumbar paralumbar tenderness or spasm  Positive leg raise left leg negative leg raise right  Negative leg rolling  Quadriceps hamstrings foot dorsiflexion foot plantarflexion 4 out of 5 bilaterally  Patella and Achilles reflexes equally diminished  Extremities without erythema edema Homans or cord    "

## 2024-01-24 NOTE — TELEPHONE ENCOUNTER
Called [employer] again for update.  Spoke with Mikaela who states the same - everything is in, a decision just has not been made yet.  No turn around time frame.  Call patient/spouse to update as well, no answer, left message on identifiable voicemail with update.

## 2024-01-25 NOTE — TELEPHONE ENCOUNTER
LEANNE received a voicemail from [facility] late Wednesday afternoon, did not leave any details.  LEANNE returned his call this morning.  No answer.  Left message on identifiable voicemail - advised application with [employer] is still pending and he could call back if he had questions.

## 2024-02-01 ENCOUNTER — OFFICE VISIT (OUTPATIENT)
Dept: PAIN MEDICINE | Facility: CLINIC | Age: 84
End: 2024-02-01
Payer: MEDICARE

## 2024-02-01 VITALS
RESPIRATION RATE: 18 BRPM | HEART RATE: 76 BPM | DIASTOLIC BLOOD PRESSURE: 84 MMHG | TEMPERATURE: 97.7 F | OXYGEN SATURATION: 98 % | BODY MASS INDEX: 24.16 KG/M2 | SYSTOLIC BLOOD PRESSURE: 135 MMHG | WEIGHT: 145 LBS | HEIGHT: 65 IN

## 2024-02-01 DIAGNOSIS — M48.061 SPINAL STENOSIS OF LUMBAR REGION, UNSPECIFIED WHETHER NEUROGENIC CLAUDICATION PRESENT: ICD-10-CM

## 2024-02-01 PROCEDURE — 1159F MED LIST DOCD IN RCRD: CPT | Performed by: ANESTHESIOLOGY

## 2024-02-01 PROCEDURE — 1160F RVW MEDS BY RX/DR IN RCRD: CPT | Performed by: ANESTHESIOLOGY

## 2024-02-01 PROCEDURE — 1125F AMNT PAIN NOTED PAIN PRSNT: CPT | Performed by: ANESTHESIOLOGY

## 2024-02-01 PROCEDURE — 1036F TOBACCO NON-USER: CPT | Performed by: ANESTHESIOLOGY

## 2024-02-01 PROCEDURE — 99214 OFFICE O/P EST MOD 30 MIN: CPT | Performed by: ANESTHESIOLOGY

## 2024-02-01 PROCEDURE — 99204 OFFICE O/P NEW MOD 45 MIN: CPT | Performed by: ANESTHESIOLOGY

## 2024-02-01 RX ORDER — GABAPENTIN 100 MG/1
100-200 CAPSULE ORAL NIGHTLY
Qty: 60 CAPSULE | Refills: 0 | Status: SHIPPED | OUTPATIENT
Start: 2024-02-01 | End: 2024-03-02

## 2024-02-01 SDOH — ECONOMIC STABILITY: FOOD INSECURITY: WITHIN THE PAST 12 MONTHS, YOU WORRIED THAT YOUR FOOD WOULD RUN OUT BEFORE YOU GOT MONEY TO BUY MORE.: NEVER TRUE

## 2024-02-01 SDOH — ECONOMIC STABILITY: FOOD INSECURITY: WITHIN THE PAST 12 MONTHS, THE FOOD YOU BOUGHT JUST DIDN'T LAST AND YOU DIDN'T HAVE MONEY TO GET MORE.: NEVER TRUE

## 2024-02-01 ASSESSMENT — ENCOUNTER SYMPTOMS
ABDOMINAL PAIN: 0
FEVER: 0
BACK PAIN: 1
EYE PAIN: 0
FREQUENCY: 1
WEAKNESS: 1
ADENOPATHY: 0
LOSS OF SENSATION IN FEET: 1
SHORTNESS OF BREATH: 0
DEPRESSION: 0
OCCASIONAL FEELINGS OF UNSTEADINESS: 1

## 2024-02-01 ASSESSMENT — LIFESTYLE VARIABLES
HOW OFTEN DO YOU HAVE A DRINK CONTAINING ALCOHOL: MONTHLY OR LESS
SKIP TO QUESTIONS 9-10: 1
AUDIT-C TOTAL SCORE: 1
HOW MANY STANDARD DRINKS CONTAINING ALCOHOL DO YOU HAVE ON A TYPICAL DAY: 1 OR 2
HOW OFTEN DO YOU HAVE SIX OR MORE DRINKS ON ONE OCCASION: NEVER
TOTAL SCORE: 6

## 2024-02-01 ASSESSMENT — PAIN - FUNCTIONAL ASSESSMENT: PAIN_FUNCTIONAL_ASSESSMENT: 0-10

## 2024-02-01 ASSESSMENT — PAIN SCALES - GENERAL
PAINLEVEL_OUTOF10: 10 - WORST POSSIBLE PAIN
PAINLEVEL: 10-WORST PAIN EVER

## 2024-02-01 NOTE — PROGRESS NOTES
Chief Complain    New Patient Visit (For pain in left leg, back. Have metastatic breast cancer, cancer and in spine. Had a fall and fractured right arm 9/30/23. Deny neck and back surgery. Had images done at [facility]. Have a  oncologist. Currently take a chemo pill. Currently taking ibrofean,)    History Of Present Illness  Angela Hope is a 83 y.o. female here for evaluation of right upper arm pain, low back pain, radiating to left lower extremity. The patient has been experiencing these symptoms for last 3 month(s). The patient describes the pain as aching, stabbing. The patient's current pain score is 10 on a scale from 0-10. The pain is worsened by walking and is alleviated by medications nonsteroidal anti-inflammatory drugs. Since the start of the symptoms the pain has been unchanged.    The patient denies any fever, chills, weight loss, weakness, numbness, bladder/ bowel incontinence, history of IV drug abuse.    History of metastatic breast cancer      Past Medical History  She has a past medical history of Personal history of colonic polyps (10/06/2016), Personal history of malignant neoplasm of breast (08/23/2019), Personal history of other infectious and parasitic diseases (11/01/2016), Personal history of other infectious and parasitic diseases, and Personal history of other infectious and parasitic diseases.    Surgical History  She has a past surgical history that includes Rotator cuff repair (08/23/2018); Other surgical history (08/23/2019); Other surgical history (08/23/2019); Other surgical history (08/23/2019); and Other surgical history (08/23/2019).    Social History  She reports that she has never smoked. She has never used smokeless tobacco. She reports that she does not currently use alcohol. She reports that she does not use drugs.    Family History  Family History   Problem Relation Name Age of Onset    Lung cancer Brother          Allergies  Methylprednisolone    Review of Systems  Review  "of Systems   Constitutional:  Negative for fever.   HENT:  Negative for ear pain.    Eyes:  Negative for pain.   Respiratory:  Negative for shortness of breath.    Cardiovascular:  Negative for chest pain.   Gastrointestinal:  Negative for abdominal pain.   Endocrine: Negative for cold intolerance and heat intolerance.   Genitourinary:  Positive for frequency.   Musculoskeletal:  Positive for back pain.   Skin:  Negative for rash.   Allergic/Immunologic: Negative for food allergies.   Neurological:  Positive for weakness.   Hematological:  Negative for adenopathy.   Psychiatric/Behavioral:  Negative for suicidal ideas.         Physical Exam  Physical Exam  HENT:      Head: Normocephalic.   Eyes:      Extraocular Movements: Extraocular movements intact.      Pupils: Pupils are equal, round, and reactive to light.   Cardiovascular:      Rate and Rhythm: Normal rate.   Pulmonary:      Effort: Pulmonary effort is normal.   Musculoskeletal:      Cervical back: Neck supple.   Skin:     General: Skin is warm.   Neurological:      Mental Status: She is alert and oriented to person, place, and time.      Comments: left EHL weakness 4+/5, rest normal   Psychiatric:         Mood and Affect: Mood normal.           Last Recorded Vitals  Blood pressure 135/84, pulse 76, temperature 36.5 °C (97.7 °F), resp. rate 18, height 1.651 m (5' 5\"), weight 65.8 kg (145 lb).    Reviewed Images  Reviewed and independently interpreted CT scan of abdomen focus on lumbar spine revealing significant spinal canal allergies to stenosis L4-5    Reviewed Labs   Latest Reference Range & Units 01/22/24 13:44   WBC 4.4 - 11.3 x10*3/uL 2.0 (L)   nRBC 0.0 - 0.0 /100 WBCs 0.0   RBC 4.00 - 5.20 x10*6/uL 3.42 (L)   HEMOGLOBIN 12.0 - 16.0 g/dL 11.2 (L)   HEMATOCRIT 36.0 - 46.0 % 33.6 (L)   MCV 80 - 100 fL 98   MCH 26.0 - 34.0 pg 32.7   MCHC 32.0 - 36.0 g/dL 33.3   RED CELL DISTRIBUTION WIDTH 11.5 - 14.5 % 15.9 (H)   Platelets 150 - 450 x10*3/uL 184   (L): " Data is abnormally low  (H): Data is abnormally high     Assessment/Plan   Encounter Diagnosis   Name Primary?    Spinal stenosis of lumbar region, unspecified whether neurogenic claudication present                       Angela Hope is a 83 y.o. female here for evaluation of right arm pain, low back pain left lower extremity radiating to left lower extremity roughly in L5 distribution.  That is bothering her the most at this point.  She has past medical history significant of metastatic breast cancer, he has a history of some potential metastatic lesions which has been stable in the last bone scan done earlier this month.  Review of her CT of her abdomen with focus on lumbar spine does reveal severe spinal canal and lateral recess stenosis at L4-5 which is like responsible for her symptoms.  Currently managing her pain with ibuprofen with modest benefit, would trial her on low-dose of gabapentin, if she does not respond would consider doing an epidural steroid injection as neck step in managing her pain.    I spent 45 minutes in the professional and overall care of this patient.       Luis Forman MD

## 2024-02-02 ENCOUNTER — TELEPHONE (OUTPATIENT)
Dept: INFUSION THERAPY | Facility: CLINIC | Age: 84
End: 2024-02-02
Payer: MEDICARE

## 2024-02-02 DIAGNOSIS — M54.16 LUMBAR RADICULOPATHY: Primary | ICD-10-CM

## 2024-02-02 NOTE — TELEPHONE ENCOUNTER
"Patient's  called stating she was prescribed gabapentin but didn't realize it during the visit.  She did not pick it up because it \"makes her sick\".  She has taken it in the past and has caused N/V. I added it to her allergy list.  He wants to know if there is something else you can prescribe?   "

## 2024-02-02 NOTE — TELEPHONE ENCOUNTER
We can try her on Lyrica for a week or so to see the response, although it is very similar to gabapentin and how it works it is a controlled substance.  Risk-benefit profile is very similar to to that of gabapentin, if she decides to continue after that 1 week trial we would need to get her back to sign a controlled substance agreement and a urine drug screen.

## 2024-02-05 RX ORDER — PREGABALIN 75 MG/1
CAPSULE ORAL
Qty: 21 CAPSULE | Refills: 0 | Status: SHIPPED | OUTPATIENT
Start: 2024-02-05 | End: 2024-02-19

## 2024-02-06 NOTE — TELEPHONE ENCOUNTER
ALEKSANDARZEINAB called [employer] again for update.  Spoke with Elis.  She reports that application is processing and is scheduled for a determination at the end of this week.  She also states that they did send a 2 month supply of her Ibrance at the end of December - this 2 month supply was delivered to her on 12/31/23, so she should still be on track with her therapy and not having any interruption.  They will call her with determination and go over steps or ordering if needed at that time.

## 2024-02-09 ENCOUNTER — TELEPHONE (OUTPATIENT)
Dept: PAIN MEDICINE | Facility: CLINIC | Age: 84
End: 2024-02-09
Payer: MEDICARE

## 2024-02-20 NOTE — TELEPHONE ENCOUNTER
Received fax approval 2/19/24 from [facility] as well as a voicemail.  Placed call to cayla this morning to advise of approval and to continue to refill with them as usual.  Had to leave message on identifiable voicemail.  Advised of call back number if any questions.

## 2024-02-26 ENCOUNTER — APPOINTMENT (OUTPATIENT)
Dept: PRIMARY CARE | Facility: CLINIC | Age: 84
End: 2024-02-26
Payer: MEDICARE

## 2024-03-07 ENCOUNTER — OFFICE VISIT (OUTPATIENT)
Dept: PAIN MEDICINE | Facility: CLINIC | Age: 84
End: 2024-03-07
Payer: MEDICARE

## 2024-03-07 VITALS
OXYGEN SATURATION: 99 % | DIASTOLIC BLOOD PRESSURE: 83 MMHG | TEMPERATURE: 98 F | HEART RATE: 72 BPM | RESPIRATION RATE: 16 BRPM | WEIGHT: 144 LBS | BODY MASS INDEX: 23.99 KG/M2 | HEIGHT: 65 IN | SYSTOLIC BLOOD PRESSURE: 155 MMHG

## 2024-03-07 DIAGNOSIS — M54.16 LUMBAR RADICULOPATHY: Primary | ICD-10-CM

## 2024-03-07 PROCEDURE — 1160F RVW MEDS BY RX/DR IN RCRD: CPT | Performed by: ANESTHESIOLOGY

## 2024-03-07 PROCEDURE — 1159F MED LIST DOCD IN RCRD: CPT | Performed by: ANESTHESIOLOGY

## 2024-03-07 PROCEDURE — 1125F AMNT PAIN NOTED PAIN PRSNT: CPT | Performed by: ANESTHESIOLOGY

## 2024-03-07 PROCEDURE — 1036F TOBACCO NON-USER: CPT | Performed by: ANESTHESIOLOGY

## 2024-03-07 PROCEDURE — 99213 OFFICE O/P EST LOW 20 MIN: CPT | Performed by: ANESTHESIOLOGY

## 2024-03-07 ASSESSMENT — ENCOUNTER SYMPTOMS
LOSS OF SENSATION IN FEET: 1
OCCASIONAL FEELINGS OF UNSTEADINESS: 1
SHORTNESS OF BREATH: 0
BACK PAIN: 1
EYE REDNESS: 0
DEPRESSION: 0

## 2024-03-07 ASSESSMENT — PAIN SCALES - GENERAL
PAINLEVEL: 9
PAINLEVEL_OUTOF10: 10 - WORST POSSIBLE PAIN

## 2024-03-07 ASSESSMENT — PAIN - FUNCTIONAL ASSESSMENT: PAIN_FUNCTIONAL_ASSESSMENT: 0-10

## 2024-03-07 ASSESSMENT — PAIN DESCRIPTION - DESCRIPTORS: DESCRIPTORS: THROBBING

## 2024-03-07 ASSESSMENT — PATIENT HEALTH QUESTIONNAIRE - PHQ9
1. LITTLE INTEREST OR PLEASURE IN DOING THINGS: SEVERAL DAYS
2. FEELING DOWN, DEPRESSED OR HOPELESS: SEVERAL DAYS
10. IF YOU CHECKED OFF ANY PROBLEMS, HOW DIFFICULT HAVE THESE PROBLEMS MADE IT FOR YOU TO DO YOUR WORK, TAKE CARE OF THINGS AT HOME, OR GET ALONG WITH OTHER PEOPLE: SOMEWHAT DIFFICULT
SUM OF ALL RESPONSES TO PHQ9 QUESTIONS 1 AND 2: 2

## 2024-03-07 NOTE — PROGRESS NOTES
Chief Complain  Follow-up (Patient complaining of lower back pain that radiates down the left leg.)       History Of Present Illness  Angela Hope is a 83 y.o. female here for low back pain radiating to left lower extremity . The patient rates the pain at 10  on a scale from 0-10.  The patient describes pain as throbbing.  The pain is worsened by walking and is alleviated by lying down.  Since the last visit the pain has worsened.  The patient denies any fever, chills, weight loss, bladder/bowel incontinence.         Past Medical History  She has a past medical history of Personal history of colonic polyps (10/06/2016), Personal history of malignant neoplasm of breast (08/23/2019), Personal history of other infectious and parasitic diseases (11/01/2016), Personal history of other infectious and parasitic diseases, and Personal history of other infectious and parasitic diseases.    Surgical History  She has a past surgical history that includes Rotator cuff repair (08/23/2018); Other surgical history (08/23/2019); Other surgical history (08/23/2019); Other surgical history (08/23/2019); and Other surgical history (08/23/2019).     Social History  She reports that she has never smoked. She has never used smokeless tobacco. She reports that she does not currently use alcohol. She reports that she does not use drugs.    Family History  Family History   Problem Relation Name Age of Onset    Lung cancer Brother          Allergies  Gabapentin and Methylprednisolone    Review of Systems  Review of Systems   HENT:  Negative for ear pain.    Eyes:  Negative for redness.   Respiratory:  Negative for shortness of breath.    Cardiovascular:  Negative for chest pain.   Musculoskeletal:  Positive for back pain.        Physical Exam  Physical Exam  HENT:      Head: Normocephalic.   Eyes:      Extraocular Movements: Extraocular movements intact.      Pupils: Pupils are equal, round, and reactive to light.   Pulmonary:       "Effort: Pulmonary effort is normal.   Neurological:      Mental Status: She is alert and oriented to person, place, and time.   Psychiatric:         Mood and Affect: Mood normal.         Last Recorded Vitals  Blood pressure 155/83, pulse 72, temperature 36.7 °C (98 °F), resp. rate 16, height 1.651 m (5' 5\"), weight 65.3 kg (144 lb), SpO2 99 %.       Assessment/Plan     Angela Hope is a 83 y.o. female here for follow-up of low back pain radiating to lower extremity roughly in L5 distribution.  She does have potential lateral recess stenosis at L4-5 on her CT abdomen.  We have tried her on gabapentin, Lyrica without any significant benefit.  Managing her pain with ibuprofen with modest benefit.  Would recommend trial of epidural steroid injection as next step in managing her pain.  Would touch base with her oncologist regarding any concerns they may have regarding the epidural steroid injection.    Luis Forman MD  "

## 2024-03-27 ENCOUNTER — APPOINTMENT (OUTPATIENT)
Dept: PAIN MEDICINE | Facility: CLINIC | Age: 84
End: 2024-03-27
Payer: MEDICARE

## 2024-03-27 ENCOUNTER — HOSPITAL ENCOUNTER (OUTPATIENT)
Dept: RADIOLOGY | Facility: CLINIC | Age: 84
End: 2024-03-27
Payer: MEDICARE

## 2024-04-15 ENCOUNTER — OFFICE VISIT (OUTPATIENT)
Dept: HEMATOLOGY/ONCOLOGY | Facility: CLINIC | Age: 84
End: 2024-04-15
Payer: MEDICARE

## 2024-04-15 VITALS
RESPIRATION RATE: 16 BRPM | WEIGHT: 148.48 LBS | DIASTOLIC BLOOD PRESSURE: 89 MMHG | TEMPERATURE: 97.7 F | SYSTOLIC BLOOD PRESSURE: 173 MMHG | OXYGEN SATURATION: 98 % | BODY MASS INDEX: 24.71 KG/M2 | HEART RATE: 76 BPM

## 2024-04-15 DIAGNOSIS — C50.919 MALIGNANT NEOPLASM OF BREAST IN FEMALE, ESTROGEN RECEPTOR POSITIVE, UNSPECIFIED LATERALITY, UNSPECIFIED SITE OF BREAST (MULTI): ICD-10-CM

## 2024-04-15 DIAGNOSIS — Z17.0 MALIGNANT NEOPLASM OF BREAST IN FEMALE, ESTROGEN RECEPTOR POSITIVE, UNSPECIFIED LATERALITY, UNSPECIFIED SITE OF BREAST (MULTI): ICD-10-CM

## 2024-04-15 PROCEDURE — 99215 OFFICE O/P EST HI 40 MIN: CPT | Performed by: STUDENT IN AN ORGANIZED HEALTH CARE EDUCATION/TRAINING PROGRAM

## 2024-04-15 PROCEDURE — 1159F MED LIST DOCD IN RCRD: CPT | Performed by: STUDENT IN AN ORGANIZED HEALTH CARE EDUCATION/TRAINING PROGRAM

## 2024-04-15 PROCEDURE — 1160F RVW MEDS BY RX/DR IN RCRD: CPT | Performed by: STUDENT IN AN ORGANIZED HEALTH CARE EDUCATION/TRAINING PROGRAM

## 2024-04-15 PROCEDURE — 1125F AMNT PAIN NOTED PAIN PRSNT: CPT | Performed by: STUDENT IN AN ORGANIZED HEALTH CARE EDUCATION/TRAINING PROGRAM

## 2024-04-15 ASSESSMENT — PAIN SCALES - GENERAL: PAINLEVEL: 7

## 2024-04-15 NOTE — PROGRESS NOTES
[facility]ast [facility]cology [facility]  Location: [place]    Visit Type: Follow-up Visit    ONC History:    She presented with a left-sided breast cancer in , requiring neoadjuvant chemotherapy. In 2006, she had resection of a 2 cm grade 2 infiltrating ductal carcinoma with 8 of 10 positive axillary lymph nodes. She was clinically staged T3 N1,  although would be N2 by today's standards. Hormone receptors are positive and HER-2/liliana negative   FAMILY HISTORY: She had a maternal aunt with a head and neck cancer at 78. She had a brother who  of lung cancer in the context of smoking.      TREATMENT SUMMARY:    Neoadjuvant chemotherapy included 4 cycles of Adriamycin and cyclophosphamide, followed by 4 cycles of paclitaxel completed  2006.    Left lumpectomy and axillary node dissection, 2006.    Left breast radiation, completed 2007.    Anastrozole was started 2007, recently as she stopped her medication either March or 2013.   Anastrozole ongoing with initial treatment started 2007, and medications stopped around 2013, and then restarted in 2013 -- she stopped sometime in 2020: First Relapse Date: Recurrence confirmed to bone- ER+   2020: New Treatment Plan: Letrozole started   2020-3-10: Radiation Therapy: SBRT to lumbar spine   2020-3-24: Chemotherapy: Palbociclib started   2020: Chemotherapy: Palbociclib decreased to 100mg due to marrow toxicity   2021: Chemotherapy: Ibrance on hold   2022: Chemotherapy: restart of Ibrance 100mg      Subjective: Interval History    Patient presents today for follow-up visit.  She declined obtaining staging scans at her last visit January 15, 2024 and reported she would call us should she agreed to proceed with scans.  Patient did not call in the interval time.    Patient reports continued low back pain.  Reports she was diagnosed with sciatica.  She was started on gabapentin  however it made her very sick.  She was then started on pregabalin and reports it did not help.  She was then referred to pain management by her primary care doctor who recommended trial of epidural steroid injection.  Patient states she is unsure if she wants to go through that with this.    Aside from her back pain patient has no complaints at this time.  She has been continuing her treatment course of letrozole and Ibrance.    Social History  Angela Hope  reports that she has never smoked. She has never used smokeless tobacco.  She  reports that she does not currently use alcohol.  She  reports no history of drug use.    ROS:     Review of Systems   All other systems reviewed and are negative.       Physical Examination:    /89   Pulse 76   Temp 36.5 °C (97.7 °F)   Resp 16   Wt 67.4 kg (148 lb 7.7 oz)   SpO2 98%   BMI 24.71 kg/m²     Physical Exam  Vitals and nursing note reviewed.   Constitutional:       General: She is not in acute distress.     Appearance: Normal appearance. She is not toxic-appearing.   HENT:      Head: Normocephalic and atraumatic.      Mouth/Throat:      Pharynx: Oropharynx is clear.   Eyes:      Extraocular Movements: Extraocular movements intact.      Conjunctiva/sclera: Conjunctivae normal.   Cardiovascular:      Rate and Rhythm: Normal rate and regular rhythm.   Pulmonary:      Effort: Pulmonary effort is normal. No respiratory distress.   Abdominal:      General: Abdomen is flat. Bowel sounds are normal.      Palpations: Abdomen is soft.   Musculoskeletal:         General: No swelling. Normal range of motion.      Cervical back: Normal range of motion.   Skin:     General: Skin is warm and dry.   Neurological:      General: No focal deficit present.      Mental Status: She is alert.   Psychiatric:         Mood and Affect: Mood normal.       Breast Examination:    Deferred    ECOG Performance Status:     [x] 0 Fully active, able to carry on all pre-disease performance  without restriction  [] 1 Restricted in physically strenuous activity but ambulatory and able to carry out work of a light or sedentary nature, e.g., light house work, office work  [] 2 Ambulatory and capable of all selfcare but unable to carry out any work activities; up and about more than 50% of waking hours  [] 3 Capable of only limited selfcare; confined to bed or chair more than 50% of waking hours  [] 4 Completely disabled; cannot carry on any selfcare; totally confined to bed or chair  [] 5 Dead     Results:    Labs:  No pertinent results since last visit.      Imaging:  No pertinent results since last visit.      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 Has been on letrozole and palbociclib 100 mg since 2020.     Patient has not had scans since her last visit per her request.  She previously reported back pain radiating into her thigh and declined imaging at that time.  Patient today tells me these are new symptoms however she her  and I reviewed my last office note and I reminded her that she did discuss with me last time and patient reports that she forgot that has been going on for that long. In the interval time she has discussed this with her primary care doctor and pain management.  She reported previously she would discuss with her orthopedic provider however she has not.  I recommended further imaging to assess her back and bones given continued complaint of back pain.  Patient was agreeable at this time.  We will obtain MRI of the lumbar spine as well as bone scan.  Otherwise I encouraged her to continue to follow-up with pain management and her primary care doctor.     Plan:    Surgical Plan: Left lumpectomy and axillary node dissection, October 2006.   Additional biopsy: Not indicated  Radiation Plan: Left breast radiation, completed January 2007; SBRT to lumbar spine in 2020  Additional  staging scans/DEXA/echo: MRI lumbar spine and bone scan ordered today  Additional Path info (i.e Ki67, PDL1):Not indicated   Gene assays: Not indicated     Systemic treatment plan: Letrozole and ibrance 100 mg                   Intent: Palliative                Clinical trial: N/A                Endocrine therapy: Progressed on anastrozole. On letrozole since 2020                HER2 treatment: Not indicated                Targeted agents: Ibrance                Chemotherapy: 4 cycles of Adriamycin and cyclophosphamide, followed by 4 cycles of paclitaxel completed September 2006.                 BMA: We discussed the role of zoledronic acid as supportive therapy in light of osseous disease to minimize the risk and delay skeletal related events. We discussed that this would be given as  an infusion every 3 months. We discussed the toxicities that may be associated with this including the risk of osteonecrosis of the jaw. I have recommended an dental evaluation before this is initiated. She was given written information on Zoledronic  acid for her review and we will plan to initiate this at her follow-up visit once she has received clearance from her dentist. Discussed with patient several times and has declined      Access: Not indicated  Supportive meds: Not indicated  Genetic testing: Will discuss at future visit  Fertility preservation: Not indicated  Other active problems/orders:      Osteopenia: Zometa discussed as above. We discussed general recommendations for bone loss prevention which include 6317-1745 mg of calcium intake per day for adults >50yrs, and no history of renal calculi;  800-1000 IU of vitamin D3 per day for adults >50yrs, and no history of renal calculi. Weight bearing exercise. Discontinue smoking. Avoid excessive use of caffeine, soft drinks, and alcoholic beverages. In addition, balance training and fall prevention  programs can help reduce the risk of fractures.      Surveillance plan:  Monthly labs and TM. Declined further scans at this time until she calls us back.     Follow-up: Will call patient with results of MRI as well as bone scan.  Otherwise follow-up in 3 months if no sooner intervention is needed    Patient expressed understanding of the plan outlined above. She had ample time to ask questions. She understands she can contact us should she have additional questions or issues arise in the interim.

## 2024-04-22 ENCOUNTER — LAB (OUTPATIENT)
Dept: LAB | Facility: LAB | Age: 84
End: 2024-04-22
Payer: MEDICARE

## 2024-04-22 DIAGNOSIS — Z17.0 MALIGNANT NEOPLASM OF BREAST IN FEMALE, ESTROGEN RECEPTOR POSITIVE, UNSPECIFIED LATERALITY, UNSPECIFIED SITE OF BREAST (MULTI): ICD-10-CM

## 2024-04-22 DIAGNOSIS — C50.919 MALIGNANT NEOPLASM OF BREAST IN FEMALE, ESTROGEN RECEPTOR POSITIVE, UNSPECIFIED LATERALITY, UNSPECIFIED SITE OF BREAST (MULTI): ICD-10-CM

## 2024-04-22 LAB
ALBUMIN SERPL BCP-MCNC: 4.5 G/DL (ref 3.4–5)
ALP SERPL-CCNC: 31 U/L (ref 33–136)
ALT SERPL W P-5'-P-CCNC: 11 U/L (ref 7–45)
ANION GAP SERPL CALC-SCNC: 14 MMOL/L (ref 10–20)
AST SERPL W P-5'-P-CCNC: 16 U/L (ref 9–39)
BASOPHILS # BLD AUTO: 0.08 X10*3/UL (ref 0–0.1)
BASOPHILS NFR BLD AUTO: 3.3 %
BILIRUB SERPL-MCNC: 0.4 MG/DL (ref 0–1.2)
BUN SERPL-MCNC: 18 MG/DL (ref 6–23)
CALCIUM SERPL-MCNC: 9.5 MG/DL (ref 8.6–10.6)
CANCER AG27-29 SERPL-ACNC: 32.5 U/ML (ref 0–38.6)
CEA SERPL-MCNC: 2.6 UG/L
CHLORIDE SERPL-SCNC: 101 MMOL/L (ref 98–107)
CO2 SERPL-SCNC: 27 MMOL/L (ref 21–32)
CREAT SERPL-MCNC: 0.93 MG/DL (ref 0.5–1.05)
EGFRCR SERPLBLD CKD-EPI 2021: 61 ML/MIN/1.73M*2
EOSINOPHIL # BLD AUTO: 0.11 X10*3/UL (ref 0–0.4)
EOSINOPHIL NFR BLD AUTO: 4.6 %
ERYTHROCYTE [DISTWIDTH] IN BLOOD BY AUTOMATED COUNT: 16.1 % (ref 11.5–14.5)
GLUCOSE SERPL-MCNC: 97 MG/DL (ref 74–99)
HCT VFR BLD AUTO: 33.5 % (ref 36–46)
HGB BLD-MCNC: 11.2 G/DL (ref 12–16)
IMM GRANULOCYTES # BLD AUTO: 0.01 X10*3/UL (ref 0–0.5)
IMM GRANULOCYTES NFR BLD AUTO: 0.4 % (ref 0–0.9)
LYMPHOCYTES # BLD AUTO: 1.07 X10*3/UL (ref 0.8–3)
LYMPHOCYTES NFR BLD AUTO: 44.4 %
MCH RBC QN AUTO: 32.5 PG (ref 26–34)
MCHC RBC AUTO-ENTMCNC: 33.4 G/DL (ref 32–36)
MCV RBC AUTO: 97 FL (ref 80–100)
MONOCYTES # BLD AUTO: 0.5 X10*3/UL (ref 0.05–0.8)
MONOCYTES NFR BLD AUTO: 20.7 %
NEUTROPHILS # BLD AUTO: 0.64 X10*3/UL (ref 1.6–5.5)
NEUTROPHILS NFR BLD AUTO: 26.6 %
NRBC BLD-RTO: 0 /100 WBCS (ref 0–0)
PLATELET # BLD AUTO: 265 X10*3/UL (ref 150–450)
POTASSIUM SERPL-SCNC: 4.6 MMOL/L (ref 3.5–5.3)
PROT SERPL-MCNC: 7 G/DL (ref 6.4–8.2)
RBC # BLD AUTO: 3.45 X10*6/UL (ref 4–5.2)
SODIUM SERPL-SCNC: 137 MMOL/L (ref 136–145)
WBC # BLD AUTO: 2.4 X10*3/UL (ref 4.4–11.3)

## 2024-04-22 PROCEDURE — 86300 IMMUNOASSAY TUMOR CA 15-3: CPT

## 2024-04-22 PROCEDURE — 82378 CARCINOEMBRYONIC ANTIGEN: CPT

## 2024-04-22 PROCEDURE — 36415 COLL VENOUS BLD VENIPUNCTURE: CPT

## 2024-04-22 PROCEDURE — 80053 COMPREHEN METABOLIC PANEL: CPT

## 2024-04-22 PROCEDURE — 85025 COMPLETE CBC W/AUTO DIFF WBC: CPT

## 2024-04-25 ENCOUNTER — APPOINTMENT (OUTPATIENT)
Dept: RADIOLOGY | Facility: HOSPITAL | Age: 84
End: 2024-04-25
Payer: MEDICARE

## 2024-05-14 ENCOUNTER — HOSPITAL ENCOUNTER (OUTPATIENT)
Dept: RADIOLOGY | Facility: CLINIC | Age: 84
Discharge: HOME | End: 2024-05-14
Payer: MEDICARE

## 2024-05-14 DIAGNOSIS — C50.919 MALIGNANT NEOPLASM OF BREAST IN FEMALE, ESTROGEN RECEPTOR POSITIVE, UNSPECIFIED LATERALITY, UNSPECIFIED SITE OF BREAST (MULTI): ICD-10-CM

## 2024-05-14 DIAGNOSIS — Z17.0 MALIGNANT NEOPLASM OF BREAST IN FEMALE, ESTROGEN RECEPTOR POSITIVE, UNSPECIFIED LATERALITY, UNSPECIFIED SITE OF BREAST (MULTI): ICD-10-CM

## 2024-05-14 PROCEDURE — A9575 INJ GADOTERATE MEGLUMI 0.1ML: HCPCS | Performed by: STUDENT IN AN ORGANIZED HEALTH CARE EDUCATION/TRAINING PROGRAM

## 2024-05-14 PROCEDURE — 2550000001 HC RX 255 CONTRASTS: Performed by: STUDENT IN AN ORGANIZED HEALTH CARE EDUCATION/TRAINING PROGRAM

## 2024-05-14 PROCEDURE — 72158 MRI LUMBAR SPINE W/O & W/DYE: CPT | Performed by: RADIOLOGY

## 2024-05-14 PROCEDURE — 72158 MRI LUMBAR SPINE W/O & W/DYE: CPT

## 2024-05-14 RX ORDER — GADOTERATE MEGLUMINE 376.9 MG/ML
13 INJECTION INTRAVENOUS
Status: COMPLETED | OUTPATIENT
Start: 2024-05-14 | End: 2024-05-14

## 2024-05-14 RX ADMIN — GADOTERATE MEGLUMINE 13 ML: 376.9 INJECTION INTRAVENOUS at 15:44

## 2024-05-16 ENCOUNTER — TELEPHONE (OUTPATIENT)
Dept: HEMATOLOGY/ONCOLOGY | Facility: CLINIC | Age: 84
End: 2024-05-16
Payer: MEDICARE

## 2024-05-16 NOTE — TELEPHONE ENCOUNTER
I called patient to discuss results of MR spine. Call went to . I left message with instructions to call back.

## 2024-05-16 NOTE — RESULT ENCOUNTER NOTE
I called patient to review results of MRI L spine. We discussed these findings in detail. She has apt in place with ortho next Thursday. Osseous involvement of her breast cancer in L spine appears relatively stable. She will reschedule previously missed bone scan. Patient was appreciative of the call. All of the patient's questions were answered and concerns were addressed. The patient has no further needs at this time. Follow-up in place for July.

## 2024-05-16 NOTE — TELEPHONE ENCOUNTER
The patient returned the call, aware that Dr. Kemp is back in clinic and will call them back later today, no further questions or concerns.

## 2024-05-29 DIAGNOSIS — E03.9 HYPOTHYROIDISM, UNSPECIFIED: ICD-10-CM

## 2024-05-29 RX ORDER — LEVOTHYROXINE SODIUM 100 UG/1
TABLET ORAL
Qty: 84 TABLET | Refills: 3 | OUTPATIENT
Start: 2024-05-29

## 2024-07-01 DIAGNOSIS — E03.9 HYPOTHYROIDISM, UNSPECIFIED TYPE: ICD-10-CM

## 2024-07-01 RX ORDER — LEVOTHYROXINE SODIUM 100 UG/1
TABLET ORAL
Qty: 90 TABLET | Refills: 0 | OUTPATIENT
Start: 2024-07-01

## 2024-07-06 DIAGNOSIS — G89.29 CHRONIC PAIN OF LEFT KNEE: ICD-10-CM

## 2024-07-06 DIAGNOSIS — M25.562 CHRONIC PAIN OF LEFT KNEE: ICD-10-CM

## 2024-07-08 DIAGNOSIS — E03.9 HYPOTHYROIDISM, UNSPECIFIED TYPE: Primary | ICD-10-CM

## 2024-07-08 RX ORDER — IBUPROFEN 600 MG/1
TABLET ORAL
Qty: 360 TABLET | Refills: 1 | Status: SHIPPED | OUTPATIENT
Start: 2024-07-08

## 2024-07-08 RX ORDER — LEVOTHYROXINE SODIUM 100 UG/1
TABLET ORAL
Qty: 90 TABLET | Refills: 1 | Status: SHIPPED | OUTPATIENT
Start: 2024-07-08 | End: 2024-07-10 | Stop reason: SDUPTHER

## 2024-07-10 DIAGNOSIS — E03.9 HYPOTHYROIDISM, UNSPECIFIED TYPE: ICD-10-CM

## 2024-07-10 RX ORDER — LEVOTHYROXINE SODIUM 100 UG/1
TABLET ORAL
Qty: 90 TABLET | Refills: 1 | Status: SHIPPED | OUTPATIENT
Start: 2024-07-10

## 2024-07-29 ENCOUNTER — OFFICE VISIT (OUTPATIENT)
Dept: HEMATOLOGY/ONCOLOGY | Facility: CLINIC | Age: 84
End: 2024-07-29
Payer: MEDICARE

## 2024-07-29 ENCOUNTER — LAB (OUTPATIENT)
Dept: LAB | Facility: CLINIC | Age: 84
End: 2024-07-29
Payer: MEDICARE

## 2024-07-29 VITALS
DIASTOLIC BLOOD PRESSURE: 69 MMHG | WEIGHT: 148.59 LBS | OXYGEN SATURATION: 94 % | RESPIRATION RATE: 16 BRPM | SYSTOLIC BLOOD PRESSURE: 148 MMHG | HEART RATE: 77 BPM | TEMPERATURE: 97.2 F | BODY MASS INDEX: 24.73 KG/M2

## 2024-07-29 DIAGNOSIS — C50.919 MALIGNANT NEOPLASM OF BREAST IN FEMALE, ESTROGEN RECEPTOR POSITIVE, UNSPECIFIED LATERALITY, UNSPECIFIED SITE OF BREAST (MULTI): Primary | ICD-10-CM

## 2024-07-29 DIAGNOSIS — Z17.0 MALIGNANT NEOPLASM OF BREAST IN FEMALE, ESTROGEN RECEPTOR POSITIVE, UNSPECIFIED LATERALITY, UNSPECIFIED SITE OF BREAST (MULTI): Primary | ICD-10-CM

## 2024-07-29 DIAGNOSIS — C50.919 MALIGNANT NEOPLASM OF BREAST IN FEMALE, ESTROGEN RECEPTOR POSITIVE, UNSPECIFIED LATERALITY, UNSPECIFIED SITE OF BREAST (MULTI): ICD-10-CM

## 2024-07-29 DIAGNOSIS — Z17.0 MALIGNANT NEOPLASM OF BREAST IN FEMALE, ESTROGEN RECEPTOR POSITIVE, UNSPECIFIED LATERALITY, UNSPECIFIED SITE OF BREAST (MULTI): ICD-10-CM

## 2024-07-29 LAB
BASOPHILS # BLD AUTO: 0.05 X10*3/UL (ref 0–0.1)
BASOPHILS NFR BLD AUTO: 1 %
EOSINOPHIL # BLD AUTO: 0.18 X10*3/UL (ref 0–0.4)
EOSINOPHIL NFR BLD AUTO: 3.5 %
ERYTHROCYTE [DISTWIDTH] IN BLOOD BY AUTOMATED COUNT: 16.4 % (ref 11.5–14.5)
HCT VFR BLD AUTO: 32.3 % (ref 36–46)
HGB BLD-MCNC: 11 G/DL (ref 12–16)
IMM GRANULOCYTES # BLD AUTO: 0.02 X10*3/UL (ref 0–0.5)
IMM GRANULOCYTES NFR BLD AUTO: 0.4 % (ref 0–0.9)
LYMPHOCYTES # BLD AUTO: 0.84 X10*3/UL (ref 0.8–3)
LYMPHOCYTES NFR BLD AUTO: 16.2 %
MCH RBC QN AUTO: 34.3 PG (ref 26–34)
MCHC RBC AUTO-ENTMCNC: 34.1 G/DL (ref 32–36)
MCV RBC AUTO: 101 FL (ref 80–100)
MONOCYTES # BLD AUTO: 0.28 X10*3/UL (ref 0.05–0.8)
MONOCYTES NFR BLD AUTO: 5.4 %
NEUTROPHILS # BLD AUTO: 3.83 X10*3/UL (ref 1.6–5.5)
NEUTROPHILS NFR BLD AUTO: 73.5 %
NRBC BLD-RTO: ABNORMAL /100{WBCS}
PLATELET # BLD AUTO: 297 X10*3/UL (ref 150–450)
RBC # BLD AUTO: 3.21 X10*6/UL (ref 4–5.2)
WBC # BLD AUTO: 5.2 X10*3/UL (ref 4.4–11.3)

## 2024-07-29 PROCEDURE — 84075 ASSAY ALKALINE PHOSPHATASE: CPT | Performed by: STUDENT IN AN ORGANIZED HEALTH CARE EDUCATION/TRAINING PROGRAM

## 2024-07-29 PROCEDURE — 85025 COMPLETE CBC W/AUTO DIFF WBC: CPT

## 2024-07-29 PROCEDURE — 86300 IMMUNOASSAY TUMOR CA 15-3: CPT | Performed by: STUDENT IN AN ORGANIZED HEALTH CARE EDUCATION/TRAINING PROGRAM

## 2024-07-29 PROCEDURE — 1159F MED LIST DOCD IN RCRD: CPT | Performed by: STUDENT IN AN ORGANIZED HEALTH CARE EDUCATION/TRAINING PROGRAM

## 2024-07-29 PROCEDURE — 1125F AMNT PAIN NOTED PAIN PRSNT: CPT | Performed by: STUDENT IN AN ORGANIZED HEALTH CARE EDUCATION/TRAINING PROGRAM

## 2024-07-29 PROCEDURE — 99215 OFFICE O/P EST HI 40 MIN: CPT | Performed by: STUDENT IN AN ORGANIZED HEALTH CARE EDUCATION/TRAINING PROGRAM

## 2024-07-29 PROCEDURE — 36415 COLL VENOUS BLD VENIPUNCTURE: CPT

## 2024-07-29 PROCEDURE — 82378 CARCINOEMBRYONIC ANTIGEN: CPT | Performed by: STUDENT IN AN ORGANIZED HEALTH CARE EDUCATION/TRAINING PROGRAM

## 2024-07-29 ASSESSMENT — PAIN SCALES - GENERAL: PAINLEVEL: 4

## 2024-07-29 NOTE — PROGRESS NOTES
FUV completed. Reinforced POC, bone scan in 2 months with next FUV Virtual per pt request, due end of October. Pt verbalizes understanding via teach back method and states she will call for scheduling the bone scan due to her  having surgery around that time frame, agreeable to timing of virtual visit.

## 2024-07-29 NOTE — PROGRESS NOTES
Breast [facility] Oncology Clinic  Location: [facility] [place]    Visit Type: Follow-up Visit    ONC History:    She presented with a left-sided breast cancer in , requiring neoadjuvant chemotherapy. In 2006, she had resection of a 2 cm grade 2 infiltrating ductal carcinoma with 8 of 10 positive axillary lymph nodes. She was clinically staged T3 N1,  although would be N2 by today's standards. Hormone receptors are positive and HER-2/liliana negative   FAMILY HISTORY: She had a maternal aunt with a head and neck cancer at 78. She had a brother who  of lung cancer in the context of smoking.      TREATMENT SUMMARY:    Neoadjuvant chemotherapy included 4 cycles of Adriamycin and cyclophosphamide, followed by 4 cycles of paclitaxel completed  2006.    Left lumpectomy and axillary node dissection, 2006.    Left breast radiation, completed 2007.    Anastrozole was started 2007, recently as she stopped her medication either March or 2013.   Anastrozole ongoing with initial treatment started 2007, and medications stopped around 2013, and then restarted in 2013 -- she stopped sometime in 2020: First Relapse Date: Recurrence confirmed to bone- ER+   2020: New Treatment Plan: Letrozole started   2020-3-10: Radiation Therapy: SBRT to lumbar spine   2020-3-24: Chemotherapy: Palbociclib started   2020: Chemotherapy: Palbociclib decreased to 100mg due to marrow toxicity   2021: Chemotherapy: Ibrance on hold   2022: Chemotherapy: restart of Ibrance 100mg      Subjective: Interval History    Patient presents today for follow-up visit.  She is accompanied by her .  She underwent left knee injection 2 weeks ago and feels that her knee pain is better.  She states she was told by her orthopedic surgeon that she needs hip surgery however this will not happen for a couple of months.  She continues with Ibrance and letrozole.  She  continues to tolerate it well.  Otherwise she has no new symptoms or complaints.  She states she wants to defer any further imaging for the next couple of months.  Her  has knee surgery planned for next month and she wants to focus all of her attention on him at this time.      Social History  Angela Hope  reports that she has never smoked. She has never used smokeless tobacco.  She  reports that she does not currently use alcohol.  She  reports no history of drug use.    ROS:     Review of Systems   All other systems reviewed and are negative.       Physical Examination:    /69   Pulse 77   Temp 36.2 °C (97.2 °F)   Resp 16   Wt 67.4 kg (148 lb 9.4 oz)   SpO2 94%   BMI 24.73 kg/m²     Physical Exam  Vitals and nursing note reviewed.   Constitutional:       General: She is not in acute distress.     Appearance: Normal appearance. She is not toxic-appearing.   HENT:      Head: Normocephalic and atraumatic.   Eyes:      Conjunctiva/sclera: Conjunctivae normal.   Cardiovascular:      Rate and Rhythm: Normal rate.   Pulmonary:      Effort: Pulmonary effort is normal. No respiratory distress.   Abdominal:      General: Abdomen is flat.      Palpations: Abdomen is soft.   Musculoskeletal:         General: No swelling. Normal range of motion.      Cervical back: Normal range of motion.   Skin:     General: Skin is warm and dry.   Neurological:      Mental Status: She is alert.   Psychiatric:         Mood and Affect: Mood normal.         Breast Examination:    Deferred    ECOG Performance Status:     [x] 0 Fully active, able to carry on all pre-disease performance without restriction  [] 1 Restricted in physically strenuous activity but ambulatory and able to carry out work of a light or sedentary nature, e.g., light house work, office work  [] 2 Ambulatory and capable of all selfcare but unable to carry out any work activities; up and about more than 50% of waking hours  [] 3 Capable of only limited  selfcare; confined to bed or chair more than 50% of waking hours  [] 4 Completely disabled; cannot carry on any selfcare; totally confined to bed or chair  [] 5 Dead     Results:    Labs:  No pertinent results since last visit.      Imaging:  No pertinent results since last visit.      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 Has been on letrozole and palbociclib 100 mg since 2020.     Overall patient is doing well with no major clinical change.  She has declined further imaging at this time.  She states she wants to focus her attention on her 's health.  She agrees to obtain imaging in 3 months.  Orders placed today.  She states she will cancel if she feels she is not ready to obtain them.    Plan:    Surgical Plan: Left lumpectomy and axillary node dissection, October 2006.   Additional biopsy: Not indicated  Radiation Plan: Left breast radiation, completed January 2007; SBRT to lumbar spine in 2020  Additional staging scans/DEXA/echo: MRI lumbar spine and bone scan ordered today  Additional Path info (i.e Ki67, PDL1):Not indicated   Gene assays: Not indicated     Systemic treatment plan: Letrozole and ibrance 100 mg                   Intent: Palliative                Clinical trial: N/A                Endocrine therapy: Progressed on anastrozole. On letrozole since 2020                HER2 treatment: Not indicated                Targeted agents: Ibrance                Chemotherapy: 4 cycles of Adriamycin and cyclophosphamide, followed by 4 cycles of paclitaxel completed September 2006.                 BMA: We discussed the role of zoledronic acid as supportive therapy in light of osseous disease to minimize the risk and delay skeletal related events. We discussed that this would be given as  an infusion every 3 months. We discussed the toxicities that may be associated with this including the risk of  osteonecrosis of the jaw. I have recommended an dental evaluation before this is initiated. She was given written information on Zoledronic  acid for her review and we will plan to initiate this at her follow-up visit once she has received clearance from her dentist. Discussed with patient several times and has declined      Access: Not indicated  Supportive meds: Not indicated  Genetic testing: Will discuss at future visit  Fertility preservation: Not indicated  Other active problems/orders:      Osteopenia: Zometa discussed as above. We discussed general recommendations for bone loss prevention which include 4130-7038 mg of calcium intake per day for adults >50yrs, and no history of renal calculi;  800-1000 IU of vitamin D3 per day for adults >50yrs, and no history of renal calculi. Weight bearing exercise. Discontinue smoking. Avoid excessive use of caffeine, soft drinks, and alcoholic beverages. In addition, balance training and fall prevention  programs can help reduce the risk of fractures.      Surveillance plan: Monthly labs and TM.  Restaging 3 months    Follow-up: 3 months for scan review    Patient expressed understanding of the plan outlined above. She had ample time to ask questions. She understands she can contact us should she have additional questions or issues arise in the interim.

## 2024-07-30 LAB
ALBUMIN SERPL BCP-MCNC: 4.6 G/DL (ref 3.4–5)
ALP SERPL-CCNC: 33 U/L (ref 33–136)
ALT SERPL W P-5'-P-CCNC: 13 U/L (ref 7–45)
ANION GAP SERPL CALC-SCNC: 15 MMOL/L (ref 10–20)
AST SERPL W P-5'-P-CCNC: 16 U/L (ref 9–39)
BILIRUB SERPL-MCNC: 0.7 MG/DL (ref 0–1.2)
BUN SERPL-MCNC: 23 MG/DL (ref 6–23)
CALCIUM SERPL-MCNC: 10.2 MG/DL (ref 8.6–10.6)
CANCER AG27-29 SERPL-ACNC: 43.9 U/ML (ref 0–38.6)
CEA SERPL-MCNC: 4.1 UG/L
CHLORIDE SERPL-SCNC: 101 MMOL/L (ref 98–107)
CO2 SERPL-SCNC: 25 MMOL/L (ref 21–32)
CREAT SERPL-MCNC: 1.05 MG/DL (ref 0.5–1.05)
EGFRCR SERPLBLD CKD-EPI 2021: 53 ML/MIN/1.73M*2
GLUCOSE SERPL-MCNC: 98 MG/DL (ref 74–99)
POTASSIUM SERPL-SCNC: 4.8 MMOL/L (ref 3.5–5.3)
PROT SERPL-MCNC: 7.1 G/DL (ref 6.4–8.2)
SODIUM SERPL-SCNC: 136 MMOL/L (ref 136–145)

## 2024-09-04 DIAGNOSIS — N63.0 BREAST MASS IN FEMALE: ICD-10-CM

## 2024-09-04 RX ORDER — LETROZOLE 2.5 MG/1
2.5 TABLET, FILM COATED ORAL DAILY
Qty: 90 TABLET | Refills: 3 | Status: SHIPPED | OUTPATIENT
Start: 2024-09-04

## 2024-10-28 ENCOUNTER — SOCIAL WORK (OUTPATIENT)
Dept: HEMATOLOGY/ONCOLOGY | Facility: CLINIC | Age: 84
End: 2024-10-28
Payer: MEDICARE

## 2024-11-04 ENCOUNTER — SOCIAL WORK (OUTPATIENT)
Dept: HEMATOLOGY/ONCOLOGY | Facility: CLINIC | Age: 84
End: 2024-11-04
Payer: MEDICARE

## 2024-11-04 NOTE — PROGRESS NOTES
This  called this pt for the 2nd time to follow up about her Ibrance pt assistance through [employer]. The pt's  said the SW could call back at 11 to speak to the pt. The SW called at 11am and received a voicemail. The SW asked for a return phone call to [phone].

## 2024-11-19 DIAGNOSIS — M25.562 CHRONIC PAIN OF LEFT KNEE: ICD-10-CM

## 2024-11-19 DIAGNOSIS — G89.29 CHRONIC PAIN OF LEFT KNEE: ICD-10-CM

## 2024-11-19 RX ORDER — IBUPROFEN 600 MG/1
600 TABLET ORAL EVERY 8 HOURS PRN
Qty: 360 TABLET | Refills: 1 | Status: SHIPPED | OUTPATIENT
Start: 2024-11-19

## 2024-11-20 ENCOUNTER — SOCIAL WORK (OUTPATIENT)
Dept: HEMATOLOGY/ONCOLOGY | Facility: CLINIC | Age: 84
End: 2024-11-20
Payer: MEDICARE

## 2024-11-20 NOTE — PROGRESS NOTES
This  [facility] this pt [facility] [employer]. The pt did not answer the phone. The SW left a message asking for a return phone call.    [Normal] : soft, non-tender, non-distended, no masses palpated, no HSM and normal bowel sounds [No CVA Tenderness] : no CVA  tenderness

## 2024-11-22 ENCOUNTER — APPOINTMENT (OUTPATIENT)
Dept: ENDOCRINOLOGY | Facility: CLINIC | Age: 84
End: 2024-11-22
Payer: MEDICARE

## 2024-12-10 ENCOUNTER — OFFICE VISIT (OUTPATIENT)
Dept: PRIMARY CARE | Facility: CLINIC | Age: 84
End: 2024-12-10
Payer: MEDICARE

## 2024-12-10 VITALS
HEART RATE: 83 BPM | OXYGEN SATURATION: 96 % | HEIGHT: 65 IN | BODY MASS INDEX: 23.82 KG/M2 | SYSTOLIC BLOOD PRESSURE: 126 MMHG | TEMPERATURE: 96.1 F | DIASTOLIC BLOOD PRESSURE: 80 MMHG | WEIGHT: 143 LBS

## 2024-12-10 DIAGNOSIS — J01.00 ACUTE MAXILLARY SINUSITIS, RECURRENCE NOT SPECIFIED: Primary | ICD-10-CM

## 2024-12-10 PROCEDURE — G2211 COMPLEX E/M VISIT ADD ON: HCPCS | Performed by: FAMILY MEDICINE

## 2024-12-10 PROCEDURE — 99214 OFFICE O/P EST MOD 30 MIN: CPT | Performed by: FAMILY MEDICINE

## 2024-12-10 PROCEDURE — 1159F MED LIST DOCD IN RCRD: CPT | Performed by: FAMILY MEDICINE

## 2024-12-10 PROCEDURE — 1160F RVW MEDS BY RX/DR IN RCRD: CPT | Performed by: FAMILY MEDICINE

## 2024-12-10 PROCEDURE — 1123F ACP DISCUSS/DSCN MKR DOCD: CPT | Performed by: FAMILY MEDICINE

## 2024-12-10 PROCEDURE — 1158F ADVNC CARE PLAN TLK DOCD: CPT | Performed by: FAMILY MEDICINE

## 2024-12-10 RX ORDER — CEFDINIR 300 MG/1
300 CAPSULE ORAL 2 TIMES DAILY
Qty: 20 CAPSULE | Refills: 0 | Status: SHIPPED | OUTPATIENT
Start: 2024-12-10 | End: 2024-12-20

## 2024-12-10 ASSESSMENT — PATIENT HEALTH QUESTIONNAIRE - PHQ9
2. FEELING DOWN, DEPRESSED OR HOPELESS: NOT AT ALL
6. FEELING BAD ABOUT YOURSELF - OR THAT YOU ARE A FAILURE OR HAVE LET YOURSELF OR YOUR FAMILY DOWN: NEARLY EVERY DAY
SUM OF ALL RESPONSES TO PHQ9 QUESTIONS 1 & 2: 0
1. LITTLE INTEREST OR PLEASURE IN DOING THINGS: NOT AT ALL
1. LITTLE INTEREST OR PLEASURE IN DOING THINGS: NEARLY EVERY DAY
3. TROUBLE FALLING OR STAYING ASLEEP: NEARLY EVERY DAY
8. MOVING OR SPEAKING SO SLOWLY THAT OTHER PEOPLE COULD HAVE NOTICED. OR THE OPPOSITE, BEING SO FIGETY OR RESTLESS THAT YOU HAVE BEEN MOVING AROUND A LOT MORE THAN USUAL: NOT AT ALL
9. THOUGHTS THAT YOU WOULD BE BETTER OFF DEAD, OR OF HURTING YOURSELF: NEARLY EVERY DAY
SUM OF ALL RESPONSES TO PHQ9 QUESTIONS 1 & 2: 6
7. TROUBLE CONCENTRATING ON THINGS, SUCH AS READING THE NEWSPAPER OR WATCHING TELEVISION: SEVERAL DAYS
10. IF YOU CHECKED OFF ANY PROBLEMS, HOW DIFFICULT HAVE THESE PROBLEMS MADE IT FOR YOU TO DO YOUR WORK, TAKE CARE OF THINGS AT HOME, OR GET ALONG WITH OTHER PEOPLE: VERY DIFFICULT
5. POOR APPETITE OR OVEREATING: NEARLY EVERY DAY
SUM OF ALL RESPONSES TO PHQ QUESTIONS 1-9: 22
4. FEELING TIRED OR HAVING LITTLE ENERGY: NEARLY EVERY DAY
2. FEELING DOWN, DEPRESSED OR HOPELESS: NEARLY EVERY DAY

## 2024-12-10 NOTE — PROGRESS NOTES
"Complaining of nasal congestion for   28  days associated with fever  rhinorrhea facial pressure nocturnal cough malaise chest congestion  sputum production and wheezing  without otalgia otorrhea sore throat shortness of breath  chest pain  purulent sputum lightheadedness facial swelling neck stiffness photophobia  arthralgias rash abdominal cramping or diarrhea    No significant improvement with otc nasal decongestants     /80   Pulse 83   Temp 35.6 °C (96.1 °F)   Ht 1.651 m (5' 5\")   Wt 64.9 kg (143 lb)   SpO2 96%   BMI 23.80 kg/m²     Appears mildly ill but nontoxic  Skin without pallor or cyanosis  Respirations normal without retractions  Nasal mucosa erythematous  Yellow rhinorrhea seen  Sinus tenderness  TMs normal  Neck anterior cervical adenopathy  Oropharynx erythematous without exudates  Chest with rhonchi and  wheeze without rales  Heart regular rate rhythm without murmur  Abdomen nontender      "

## 2025-04-14 ENCOUNTER — APPOINTMENT (OUTPATIENT)
Dept: PRIMARY CARE | Facility: CLINIC | Age: 85
End: 2025-04-14
Payer: MEDICARE

## 2025-04-14 VITALS
DIASTOLIC BLOOD PRESSURE: 81 MMHG | BODY MASS INDEX: 23.82 KG/M2 | OXYGEN SATURATION: 95 % | HEART RATE: 79 BPM | TEMPERATURE: 97.4 F | SYSTOLIC BLOOD PRESSURE: 144 MMHG | WEIGHT: 143 LBS | HEIGHT: 65 IN

## 2025-04-14 DIAGNOSIS — M48.061 SPINAL STENOSIS OF LUMBAR REGION, UNSPECIFIED WHETHER NEUROGENIC CLAUDICATION PRESENT: Primary | ICD-10-CM

## 2025-04-14 PROCEDURE — G2211 COMPLEX E/M VISIT ADD ON: HCPCS | Performed by: FAMILY MEDICINE

## 2025-04-14 PROCEDURE — 1123F ACP DISCUSS/DSCN MKR DOCD: CPT | Performed by: FAMILY MEDICINE

## 2025-04-14 PROCEDURE — 1159F MED LIST DOCD IN RCRD: CPT | Performed by: FAMILY MEDICINE

## 2025-04-14 PROCEDURE — 99214 OFFICE O/P EST MOD 30 MIN: CPT | Performed by: FAMILY MEDICINE

## 2025-04-14 PROCEDURE — 1160F RVW MEDS BY RX/DR IN RCRD: CPT | Performed by: FAMILY MEDICINE

## 2025-04-14 PROCEDURE — 1036F TOBACCO NON-USER: CPT | Performed by: FAMILY MEDICINE

## 2025-04-14 RX ORDER — PREGABALIN 50 MG/1
CAPSULE ORAL
Qty: 60 CAPSULE | Refills: 0 | Status: SHIPPED | OUTPATIENT
Start: 2025-04-14

## 2025-04-14 ASSESSMENT — PATIENT HEALTH QUESTIONNAIRE - PHQ9
1. LITTLE INTEREST OR PLEASURE IN DOING THINGS: SEVERAL DAYS
2. FEELING DOWN, DEPRESSED OR HOPELESS: SEVERAL DAYS
10. IF YOU CHECKED OFF ANY PROBLEMS, HOW DIFFICULT HAVE THESE PROBLEMS MADE IT FOR YOU TO DO YOUR WORK, TAKE CARE OF THINGS AT HOME, OR GET ALONG WITH OTHER PEOPLE: NOT DIFFICULT AT ALL
SUM OF ALL RESPONSES TO PHQ9 QUESTIONS 1 & 2: 2

## 2025-04-14 NOTE — PROGRESS NOTES
"83-year-old female with a history of left breast cancer with bone mets status post left mastectomy with 8 out of 10 nodes positive and neoadjuvant chemotherapy gamma knife treatments to thoracic or lumbar spine here complaining of left buttock pain radiating to her foot for years currently without fevers chills sweats incontinence of urine incontinence of stool.  She complains of some bilateral hand pain as well previous CT imaging ordered through heme-onc revealed mild to moderate disc space narrowing and endplate osteophytosis throughout the thoracic spine with wedge deformity at T1 and T3 and compression fracture at T12 and L2 seen on previous scans.  Lumbar MRI done 4/15/2024 showed abnormal signal enhancement within L4 vertebral body consistent with metastatic disease as well as lumbar spinal stenosis and lumbar neuroforaminal stenosis.  She has used gabapentin in the past unsuccessfully as it caused nausea and vomiting.  Pregabalin was ordered but she did not take it    She has a history of methylprednisolone \"allergy with rash after taking methylprednisolone    She is currently taking ibuprofen 600 mg 3 times a day as needed with no improvement      /81   Pulse 79   Temp 36.3 °C (97.4 °F)   Ht 1.651 m (5' 5\")   Wt 64.9 kg (143 lb)   SpO2 95%   BMI 23.80 kg/m²       Skin without pallor or cyanosis  Heart regular rate and rhythm  Chest clear to auscultation  Abdomen soft nondistended nontender without organomegaly or mass  No midline thoracic parathoracic midline lumbar paralumbar tenderness or spasm  Quadriceps hamstrings foot dorsiflexion foot plantarflexion 4 out of 5 bilaterally  Extremities without erythema edema Homans or cord     "

## 2025-04-14 NOTE — PATIENT INSTRUCTIONS
Pregabalin trial.  Consider pain management for possible epidural injections if she cannot tolerate or if ineffective

## 2025-05-24 DIAGNOSIS — E03.9 HYPOTHYROIDISM, UNSPECIFIED TYPE: ICD-10-CM

## 2025-05-24 DIAGNOSIS — M48.061 SPINAL STENOSIS OF LUMBAR REGION, UNSPECIFIED WHETHER NEUROGENIC CLAUDICATION PRESENT: ICD-10-CM

## 2025-05-24 RX ORDER — LEVOTHYROXINE SODIUM 100 UG/1
TABLET ORAL
Qty: 85 TABLET | Refills: 3 | OUTPATIENT
Start: 2025-05-24

## 2025-05-30 DIAGNOSIS — E03.9 HYPOTHYROIDISM, UNSPECIFIED TYPE: ICD-10-CM

## 2025-05-30 RX ORDER — LEVOTHYROXINE SODIUM 100 UG/1
TABLET ORAL
Qty: 90 TABLET | Refills: 1 | OUTPATIENT
Start: 2025-05-30

## 2025-05-30 RX ORDER — LEVOTHYROXINE SODIUM 100 UG/1
TABLET ORAL
Qty: 90 TABLET | Refills: 0 | Status: SHIPPED | OUTPATIENT
Start: 2025-05-30

## 2025-05-30 RX ORDER — PREGABALIN 50 MG/1
CAPSULE ORAL
Qty: 180 CAPSULE | Refills: 0 | Status: SHIPPED | OUTPATIENT
Start: 2025-05-30

## 2025-06-18 DIAGNOSIS — G89.29 CHRONIC PAIN OF LEFT KNEE: ICD-10-CM

## 2025-06-18 DIAGNOSIS — M25.562 CHRONIC PAIN OF LEFT KNEE: ICD-10-CM

## 2025-06-18 RX ORDER — IBUPROFEN 600 MG/1
600 TABLET, FILM COATED ORAL EVERY 8 HOURS PRN
Qty: 270 TABLET | Refills: 2 | Status: SHIPPED | OUTPATIENT
Start: 2025-06-18

## 2025-06-19 ENCOUNTER — TELEMEDICINE (OUTPATIENT)
Facility: CLINIC | Age: 85
End: 2025-06-19
Payer: MEDICARE

## 2025-06-19 VITALS — WEIGHT: 140 LBS | BODY MASS INDEX: 23.3 KG/M2

## 2025-06-19 DIAGNOSIS — E03.9 HYPOTHYROIDISM, UNSPECIFIED TYPE: Primary | ICD-10-CM

## 2025-06-19 PROCEDURE — 99213 OFFICE O/P EST LOW 20 MIN: CPT | Performed by: INTERNAL MEDICINE

## 2025-06-19 PROCEDURE — 1036F TOBACCO NON-USER: CPT | Performed by: INTERNAL MEDICINE

## 2025-06-19 PROCEDURE — 1159F MED LIST DOCD IN RCRD: CPT | Performed by: INTERNAL MEDICINE

## 2025-06-19 PROCEDURE — 1160F RVW MEDS BY RX/DR IN RCRD: CPT | Performed by: INTERNAL MEDICINE

## 2025-06-19 ASSESSMENT — ENCOUNTER SYMPTOMS
VOMITING: 0
PALPITATIONS: 0
DIARRHEA: 0
NAUSEA: 0
FEVER: 0
FATIGUE: 0
HEADACHES: 0
COUGH: 0
SHORTNESS OF BREATH: 0
CHILLS: 0

## 2025-06-19 NOTE — ASSESSMENT & PLAN NOTE
Thyroid labs when able  Adjust based on results  Orders:    TSH with reflex to Free T4 if abnormal; Future

## 2025-06-19 NOTE — PROGRESS NOTES
Endocrinology: Follow up visit  Subjective   Patient ID: Angela Hope is a 84 y.o. female who presents for Hypothyroidism.    PCP: Francisco Javier Bennett MD    HPI  Only able to do phone visit, not feeling well and no smart phone/computer or tablet  Not seen since 2023  Taking t4 as directed 6.5 pills of 100 mcg   No thyroid labs since 2023  Feeling well until last 2-3 wks feels like she has a virus.  Prior to that energy was good  Has decided not to see oncology anymore    Review of Systems   Constitutional:  Negative for chills, fatigue and fever.   Respiratory:  Negative for cough and shortness of breath.    Cardiovascular:  Negative for chest pain and palpitations.   Gastrointestinal:  Negative for diarrhea, nausea and vomiting.   Neurological:  Negative for headaches.       Problem List[1]     Home Meds:  Current Outpatient Medications   Medication Instructions    cholecalciferol (VITAMIN D-3) 50 mcg, Daily    ibuprofen 600 mg, oral, Every 8 hours PRN    letrozole (FEMARA) 2.5 mg, oral, Daily    levothyroxine (Synthroid, Levoxyl) 100 mcg tablet Take one daily six days per week and 0.5 tablet on Sunday.    pregabalin (Lyrica) 50 mg capsule No dose, route, or frequency recorded.        RX Allergies[2]     Objective   There were no vitals filed for this visit.   Vitals:    06/19/25 1541   Weight: 63.5 kg (140 lb)      Body mass index is 23.3 kg/m².   Physical Exam    Labs:  Lab Results   Component Value Date    TSH 0.81 03/24/2023    FREET4 1.36 08/30/2019      Lab Results   Component Value Date    PR1 KENNETH 10/19/2020        Assessment/Plan   Assessment & Plan  Hypothyroidism, unspecified type  Thyroid labs when able  Adjust based on results  Orders:    TSH with reflex to Free T4 if abnormal; Future        Electronically signed by:  Loulou Royal MD 06/19/25 3:43 PM                   [1]   Patient Active Problem List  Diagnosis    Breast mass in female    Chronic pain of left knee    Chronic rhinitis    Contusion  of chest    COVID-19    Dyslipidemia    Graves disease    Hypothyroidism    Lumbar spinal stenosis    Multilevel degenerative disc disease    Peripheral neuropathy    Strain of trapezius muscle    Vitamin D deficiency    Thyroid nodule    Acute sinusitis    Acute URI    Fracture of right shoulder    Laceration of forehead    Osteopenia    Malignant neoplasm of breast in female, estrogen receptor positive    Carcinoma of left breast metastatic to bone    Asymptomatic menopausal state    Unspecified osteoarthritis, unspecified site    Acute upper respiratory infection    Mass of breast    Knee pain    Contusion of chest wall    Disease due to severe acute respiratory syndrome coronavirus 2 (SARS-CoV-2)    Malignant neoplasm of female breast    Peripheral nerve disease    Encounter for antineoplastic chemotherapy    Closed fracture of proximal end of humerus    Concussion with loss of consciousness status unknown, subsequent encounter    Fall    History of malignant neoplasm of breast    History of varicella    History of measles    Pain in wrist    Pain of right shoulder region    Back pain    Thoracic aortic ectasia    Secondary malignant neoplasm of bone (Multi)   [2]   Allergies  Allergen Reactions    Gabapentin GI Upset and Nausea/vomiting    Methylprednisolone Other     Face breaks-out

## 2025-06-23 RX ORDER — ACETAMINOPHEN 500 MG
TABLET ORAL
Refills: 0 | OUTPATIENT
Start: 2025-06-23

## 2025-06-23 RX ORDER — ACETAMINOPHEN 500 MG
500 TABLET ORAL EVERY 6 HOURS PRN
COMMUNITY

## 2025-08-25 DIAGNOSIS — E03.9 HYPOTHYROIDISM, UNSPECIFIED TYPE: ICD-10-CM

## 2025-08-25 RX ORDER — LEVOTHYROXINE SODIUM 100 UG/1
TABLET ORAL
Qty: 90 TABLET | Refills: 0 | Status: SHIPPED | OUTPATIENT
Start: 2025-08-25